# Patient Record
Sex: MALE | Race: WHITE | NOT HISPANIC OR LATINO | Employment: FULL TIME | URBAN - METROPOLITAN AREA
[De-identification: names, ages, dates, MRNs, and addresses within clinical notes are randomized per-mention and may not be internally consistent; named-entity substitution may affect disease eponyms.]

---

## 2017-02-06 RX ORDER — ATORVASTATIN CALCIUM 20 MG/1
TABLET, FILM COATED ORAL
Qty: 30 TABLET | Refills: 2 | Status: SHIPPED | OUTPATIENT
Start: 2017-02-06 | End: 2017-06-20

## 2017-03-06 RX ORDER — ATORVASTATIN CALCIUM 20 MG/1
TABLET, FILM COATED ORAL
Qty: 30 TABLET | Refills: 2 | Status: SHIPPED | OUTPATIENT
Start: 2017-03-06 | End: 2017-06-20

## 2017-04-18 ENCOUNTER — DOCUMENTATION (OUTPATIENT)
Dept: GASTROENTEROLOGY | Facility: CLINIC | Age: 57
End: 2017-04-18

## 2017-04-18 ENCOUNTER — TELEPHONE (OUTPATIENT)
Dept: GASTROENTEROLOGY | Facility: CLINIC | Age: 57
End: 2017-04-18

## 2017-04-18 NOTE — PROGRESS NOTES
Called [facility] ([phone] Physician Referral) spoke with Jean.  Requested GI consult-gave my patient insurance information and Medical History  Patient qualifies for consult: Patient to call [phone] an set appointment.  Patient to hand carry Medical records at the time of visit.   Medical records to include Imaging, Medical testing, notes and summary.  [place] Reference #[id number]

## 2017-04-18 NOTE — PROGRESS NOTES
Called [facility] ([phone] Physician Referral) spoke with Jean.  Requested GI consult-gave my patient insurance information and Medical History  Patient qualifies for consult: Patient to call [phone] an set appointment.  Patient to hand carry Medical records at the time of visit.   Medical records to include Imaging, Medical testing, notes and summary.  [place] Reference #[id number]

## 2017-04-18 NOTE — TELEPHONE ENCOUNTER
Spoke with patient informed Referral request to [facility]-has been accepted gave telephone # [phone] and [facility] Reference   # [id number]. Advised patient to call and schedule appointment to hand carry all Medical Records pertinent to include Imaging, Medical notes and summary. Also, advised patient to obtain any other medical records available from other physicians that may be relevant. Informed patient medical records from this office would be available to be picked up at anytime. Patient verbalized understanding.Encouraged patient to call back with any further questions.

## 2017-05-23 ENCOUNTER — OFFICE VISIT (OUTPATIENT)
Dept: SURGERY | Facility: CLINIC | Age: 57
End: 2017-05-23

## 2017-05-23 VITALS — HEART RATE: 68 BPM | OXYGEN SATURATION: 97 % | HEIGHT: 71 IN | WEIGHT: 230 LBS | BODY MASS INDEX: 32.2 KG/M2

## 2017-05-23 DIAGNOSIS — D3A.098 CARCINOID TUMOR OF INTESTINE: ICD-10-CM

## 2017-05-23 DIAGNOSIS — R10.84 GENERALIZED ABDOMINAL PAIN: Primary | ICD-10-CM

## 2017-05-23 PROCEDURE — 99211 OFF/OP EST MAY X REQ PHY/QHP: CPT | Performed by: SURGERY

## 2017-06-12 DIAGNOSIS — E78.5 HYPERLIPIDEMIA, UNSPECIFIED HYPERLIPIDEMIA TYPE: ICD-10-CM

## 2017-06-14 LAB
ALBUMIN SERPL-MCNC: 4.5 G/DL (ref 3.5–5.5)
ALBUMIN/GLOB SERPL: 2.3 {RATIO} (ref 1.2–2.2)
ALP SERPL-CCNC: 51 IU/L (ref 39–117)
ALT SERPL-CCNC: 20 IU/L (ref 0–44)
AST SERPL-CCNC: 17 IU/L (ref 0–40)
BILIRUB SERPL-MCNC: 0.6 MG/DL (ref 0–1.2)
BUN SERPL-MCNC: 13 MG/DL (ref 6–24)
BUN/CREAT SERPL: 15 (ref 9–20)
CALCIUM SERPL-MCNC: 9.1 MG/DL (ref 8.7–10.2)
CHLORIDE SERPL-SCNC: 102 MMOL/L (ref 96–106)
CHOLEST SERPL-MCNC: 144 MG/DL (ref 100–199)
CO2 SERPL-SCNC: 23 MMOL/L (ref 18–29)
CREAT SERPL-MCNC: 0.87 MG/DL (ref 0.76–1.27)
GLOBULIN SER CALC-MCNC: 2 G/DL (ref 1.5–4.5)
GLUCOSE SERPL-MCNC: 94 MG/DL (ref 65–99)
HBA1C MFR BLD: 5.4 % (ref 4.8–5.6)
HDL SERPL-SCNC: 39 UMOL/L
HDLC SERPL-MCNC: 49 MG/DL
LDL SERPL QN: 20.2 NM
LDL SERPL-SCNC: 1062 NMOL/L
LDL SMALL SERPL-SCNC: 682 NMOL/L
LDLC SERPL CALC-MCNC: 71 MG/DL (ref 0–99)
POTASSIUM SERPL-SCNC: 4.4 MMOL/L (ref 3.5–5.2)
PROT SERPL-MCNC: 6.5 G/DL (ref 6–8.5)
SODIUM SERPL-SCNC: 141 MMOL/L (ref 134–144)
TRIGL SERPL-MCNC: 120 MG/DL (ref 0–149)

## 2017-06-20 ENCOUNTER — OFFICE VISIT (OUTPATIENT)
Dept: FAMILY MEDICINE CLINIC | Facility: CLINIC | Age: 57
End: 2017-06-20

## 2017-06-20 VITALS
BODY MASS INDEX: 32.06 KG/M2 | HEIGHT: 71 IN | HEART RATE: 64 BPM | RESPIRATION RATE: 20 BRPM | DIASTOLIC BLOOD PRESSURE: 64 MMHG | SYSTOLIC BLOOD PRESSURE: 118 MMHG | WEIGHT: 229 LBS

## 2017-06-20 DIAGNOSIS — E78.5 HYPERLIPIDEMIA, UNSPECIFIED HYPERLIPIDEMIA TYPE: Primary | ICD-10-CM

## 2017-06-20 DIAGNOSIS — I25.10 CORONARY ARTERIOSCLEROSIS: ICD-10-CM

## 2017-06-20 PROCEDURE — 99214 OFFICE O/P EST MOD 30 MIN: CPT | Performed by: INTERNAL MEDICINE

## 2017-06-20 RX ORDER — ATORVASTATIN CALCIUM 40 MG/1
40 TABLET, FILM COATED ORAL DAILY
COMMUNITY
End: 2017-09-26

## 2017-06-20 RX ORDER — ISOSORBIDE MONONITRATE 120 MG/1
120 TABLET, EXTENDED RELEASE ORAL DAILY
COMMUNITY
Start: 2017-06-05 | End: 2020-07-06

## 2017-06-20 RX ORDER — EZETIMIBE 10 MG/1
10 TABLET ORAL DAILY
Qty: 30 TABLET | Refills: 5 | Status: SHIPPED | OUTPATIENT
Start: 2017-06-20 | End: 2017-09-26 | Stop reason: SINTOL

## 2017-06-20 NOTE — PROGRESS NOTES
Subjective   Kevin M Epley is a 56 y.o. male. Patient is here today for   Chief Complaint   Patient presents with   • Hyperlipidemia          Vitals:    06/20/17 0828   BP: 118/64   Pulse: 64   Resp: 20       The following portions of the patient's history were reviewed and updated as appropriate: allergies, current medications, past family history, past medical history, past social history, past surgical history and problem list.    Past Medical History:   Diagnosis Date   • Arthritis    • Bronchitis    • CAD (coronary artery disease)    • GERD (gastroesophageal reflux disease)    • History of heart attack    • Meckel's diverticulum    • Obesity    • Osteomyelitis of spine    • Sinusitis       Allergies   Allergen Reactions   • Amlodipine Other (See Comments)     bradycardia   • Mushroom Extract Complex Nausea And Vomiting      Social History     Social History   • Marital status:      Spouse name: N/A   • Number of children: N/A   • Years of education: N/A     Occupational History   • Not on file.     Social History Main Topics   • Smoking status: Former Smoker     Packs/day: 0.50     Years: 24.00     Types: Cigarettes     Quit date: 03/2004   • Smokeless tobacco: Never Used   • Alcohol use Yes      Comment: SOCIAL   • Drug use: No   • Sexual activity: Defer     Other Topics Concern   • Not on file     Social History Narrative        Current Outpatient Prescriptions:   •  aspirin 81 MG tablet, Take by mouth., Disp: , Rfl:   •  atorvastatin (LIPITOR) 40 MG tablet, Take 40 mg by mouth Daily., Disp: , Rfl:   •  clopidogrel (PLAVIX) 75 MG tablet, Take 75 mg by mouth., Disp: , Rfl:   •  folic acid (FOLVITE) 400 MCG tablet, Take 400 mcg by mouth Daily., Disp: , Rfl:   •  isosorbide mononitrate (IMDUR) 120 MG 24 hr tablet, , Disp: , Rfl:   •  Magnesium Hydroxide (MILK OF MAGNESIA PO), Take by mouth., Disp: , Rfl:   •  Multiple Vitamins tablet, Take 1 tablet by mouth Daily., Disp: , Rfl:   •  niacin (NIASPAN) 1000  MG CR tablet, Take 1,000 mg by mouth Every Night., Disp: , Rfl:   •  nitroglycerin (NITROSTAT) 0.4 MG SL tablet, Place 0.4 mg under the tongue Every 5 (Five) Minutes As Needed. Take no more than 3 doses in 15 minutes. , Disp: , Rfl:   •  VITAMIN E PO, Take 1 tablet by mouth Daily., Disp: , Rfl:   •  ezetimibe (ZETIA) 10 MG tablet, Take 1 tablet by mouth Daily., Disp: 30 tablet, Rfl: 5     Objective   History of Present Illness Joseph is here for a lab follow-up.  He has hyperlipidemia and coronary artery disease.  He had a cardiac catheterization last December which revealed 2 occluded stents.  He eats healthy but has not exercised.  He is going to start cardiac rehabilitation soon.  He has tried 80 mg of atorvastatin but had some side effects in the past.  He currently is on 40 mg and is tolerating it.  His weight is unchanged.  He also has chronic abdominal pain and recently went to [facility] who diagnosed him with functional dyspepsia and recommended an over-the-counter product that he takes twice a day.    Review of Systems   Constitutional: Positive for activity change. Negative for unexpected weight change.   Respiratory: Negative for shortness of breath.    Cardiovascular: Negative for chest pain.   Gastrointestinal: Positive for abdominal pain.   Neurological: Negative for light-headedness.   Psychiatric/Behavioral: Negative.        Physical Exam   Constitutional: He appears well-developed and well-nourished.   Obese   Neck: Carotid bruit is not present.   Cardiovascular: Normal rate, regular rhythm and normal heart sounds.    Pulmonary/Chest: Effort normal and breath sounds normal.   Vitals reviewed.      ASSESSMENT     Problem List Items Addressed This Visit        Cardiovascular and Mediastinum    Hyperlipidemia - Primary    Relevant Medications    atorvastatin (LIPITOR) 40 MG tablet    ezetimibe (ZETIA) 10 MG tablet    Coronary arteriosclerosis    Relevant Medications    isosorbide mononitrate (IMDUR)  120 MG 24 hr tablet          PLAN  Patient Instructions   Your blood pressure is normal.  Total cholesterol is 144 with an HDL of 49 and LDL concentration of 1062.  Goal is less than 1000.  Fasting blood sugar and hemoglobin A1c are normal.  We will add Zetia 10 mg daily and continue atorvastatin 40 mg daily.      Return in about 3 months (around 9/20/2017) for labsCMP,NMR LP.

## 2017-06-20 NOTE — PATIENT INSTRUCTIONS
Your blood pressure is normal.  Total cholesterol is 144 with an HDL of 49 and LDL concentration of 1062.  Goal is less than 1000.  Fasting blood sugar and hemoglobin A1c are normal.  We will add Zetia 10 mg daily and continue atorvastatin 40 mg daily.

## 2017-06-21 LAB
PSA SERPL-MCNC: 1.2 NG/ML (ref 0–4)
WRITTEN AUTHORIZATION: NORMAL

## 2017-07-03 NOTE — PROGRESS NOTES
CHIEF COMPLAINT:    Follow-up for carcinoid tumor    HISTORY OF PRESENT ILLNESS:    Kevin M Epley is a 56 y.o. male who underwent resection of a Meckel's diverticulum on 12/10/2015.  Prior to seeing me, he was followed by Dr. Ryan Lee and Dr. Goldy Adams for chronic abdominal pain and bloating.  It was suggested to him that he probably has an internal hernia near the duodenum and was planned for surgical exploration prior to Dr. Lee' [facility].  I did a laparoscopy in his place on 12/10/2015.  We identified adhesions at the site of prior laparoscopic cholecystectomy.  Duodenum and small bowel were normal with respect to potential internal hernias, but there was a Meckel's diverticulum that I identified as the only abnormal finding. Despite its non inflamed appearance, I removed it. Accordig to the pathology report, there was an 8 mm,  grade 1 carcinoid tumor within the diverticulum.  Margins were negative.  Follow-up CT exams have been normal.  He still has abdominal pain and bloating.  There is no vomiting.  There is no blood in stool.  There is no constipation or diarrhea.  He has plans for evaluation at the [facility].    Past Medical History:   Diagnosis Date   • Arthritis    • Bronchitis    • CAD (coronary artery disease)    • GERD (gastroesophageal reflux disease)    • History of heart attack    • Meckel's diverticulum    • Obesity    • Osteomyelitis of spine    • Sinusitis        Past Surgical History:   Procedure Laterality Date   • CARDIAC CATHETERIZATION N/A 09/24/2015    LM nl, 20% pLAD, 20% mLAD, 90% pLCx, 90% ostial OM1, 30% mid RCA luminal irregularities (dominant RCA), EF 45%   • CATARACT EXTRACTION     • CHOLECYSTECTOMY     • COLONOSCOPY  2010   • COLONOSCOPY N/A 02/19/2016    IH, diverticula in the sigmoid and descending colon, 9 mm polyp in the cecum (PATH: TUBULAR ADENOMA), 5 mm polyp in the ascending colon; Dr. Pavan Rock   • CORONARY ANGIOPLASTY WITH STENT PLACEMENT N/A 02/27/2004     Cath Left Ventriculograpy, Coronary Angiography, Coronary Drug Eluting Stent, Coronary PTCA, Intravascular Ultrasound & Left Heart Catheterization, Dr. Boris Martin   • ENDOSCOPY N/A 09/30/2015    Mild Duodenal dilation, Dr. Velaqsuez Claros   • ENDOSCOPY N/A 11/8/2016    Procedure: ESOPHAGOGASTRODUODENOSCOPY to Jejunum with biopsy;  Surgeon: Goldy Adams MD;  Location: [facility] ENDOSCOPY;  Service:    • INGUINAL HERNIA REPAIR     • MECKEL DIVERTICULUM EXCISION N/A 12/10/2015    Laparoscopic adhesiolysis, laparoscopic Meckel's diverticulum; Dr. Velasquez Claros   • SHOULDER SURGERY     • THORACIC FUSION      T4-T6 fusion   • UPPER GASTROINTESTINAL ENDOSCOPY N/A 10/16/2014    Normal esophagus and stomach; Dr. Goldy Adams   • UPPER GASTROINTESTINAL ENDOSCOPY N/A 07/11/2011    LA Grade A reflux esophagitis, normal stomach, normal duodenum, dilation attempted in the gastroesophageal junction, successful; Dr. Goldy Adams         Current Outpatient Prescriptions:   •  aspirin 81 MG tablet, Take by mouth., Disp: , Rfl:   •  clopidogrel (PLAVIX) 75 MG tablet, Take 75 mg by mouth., Disp: , Rfl:   •  folic acid (FOLVITE) 400 MCG tablet, Take 400 mcg by mouth Daily., Disp: , Rfl:   •  Magnesium Hydroxide (MILK OF MAGNESIA PO), Take by mouth., Disp: , Rfl:   •  Multiple Vitamins tablet, Take 1 tablet by mouth Daily., Disp: , Rfl:   •  niacin (NIASPAN) 1000 MG CR tablet, Take 1,000 mg by mouth Every Night., Disp: , Rfl:   •  VITAMIN E PO, Take 1 tablet by mouth Daily., Disp: , Rfl:   •  atorvastatin (LIPITOR) 40 MG tablet, Take 40 mg by mouth Daily., Disp: , Rfl:   •  ezetimibe (ZETIA) 10 MG tablet, Take 1 tablet by mouth Daily., Disp: 30 tablet, Rfl: 5  •  isosorbide mononitrate (IMDUR) 120 MG 24 hr tablet, , Disp: , Rfl:   •  nitroglycerin (NITROSTAT) 0.4 MG SL tablet, Place 0.4 mg under the tongue Every 5 (Five) Minutes As Needed. Take no more than 3 doses in 15 minutes. , Disp: , Rfl:     Allergies   Allergen  "Reactions   • Amlodipine Other (See Comments)     bradycardia   • Mushroom Extract Complex Nausea And Vomiting       Family History   Problem Relation Age of Onset   • Diabetes Father    • Heart disease Other    • Stomach cancer Maternal Aunt    • Breast cancer Paternal Grandmother        Social History     Social History   • Marital status:      Spouse name: N/A   • Number of children: N/A   • Years of education: N/A     Occupational History   • Not on file.     Social History Main Topics   • Smoking status: Former Smoker     Packs/day: 0.50     Years: 24.00     Types: Cigarettes     Quit date: 03/2004   • Smokeless tobacco: Never Used   • Alcohol use Yes      Comment: SOCIAL   • Drug use: No   • Sexual activity: Defer     Other Topics Concern   • Not on file     Social History Narrative       Review of Systems   Constitutional: Positive for diaphoresis.   Eyes: Positive for itching.   Respiratory: Positive for apnea.    Cardiovascular: Positive for chest pain and palpitations.   Gastrointestinal: Positive for abdominal distention and abdominal pain.   Allergic/Immunologic: Positive for environmental allergies and food allergies.   Hematological: Bruises/bleeds easily.       Objective     Pulse 68  Ht 71\" (180.3 cm)  Wt 230 lb (104 kg)  SpO2 97%  BMI 32.08 kg/m2    Physical Exam   Constitutional: He is oriented to person, place, and time. He appears well-developed and well-nourished. No distress.   HENT:   Head: Normocephalic and atraumatic.   Eyes: Conjunctivae are normal. No scleral icterus.   Cardiovascular: Normal rate, regular rhythm, normal heart sounds and intact distal pulses.    Pulmonary/Chest: Effort normal and breath sounds normal. No respiratory distress. He has no wheezes. He has no rales.   Abdominal: Soft. Bowel sounds are normal. He exhibits no distension. There is no tenderness. There is no guarding.   Musculoskeletal: He exhibits no edema or deformity.   Neurological: He is alert and " oriented to person, place, and time.   Skin: Skin is warm and dry.   Psychiatric: He has a normal mood and affect.   Vitals reviewed.      DIAGNOSTIC DATA:    I reviewed the images and report the last CT scan of the abdomen and pelvis performed on 12/22/2016.  It was unremarkable.    ASSESSMENT:    Small carcinoid tumor found incidentally within a Meckel's diverticulum  Abdominal pain and bloating.    PLAN:    Because he has an upcoming initial-encounter visit with the [facility], I'm not going to order any imaging tests.  I've asked him to give his physicians there my office number and fax so that they can forward notes and test results.

## 2017-09-18 DIAGNOSIS — E78.5 HYPERLIPIDEMIA, UNSPECIFIED HYPERLIPIDEMIA TYPE: Primary | ICD-10-CM

## 2017-09-21 LAB
ALBUMIN SERPL-MCNC: 4.3 G/DL (ref 3.5–5.2)
ALBUMIN/GLOB SERPL: 2.3 G/DL
ALP SERPL-CCNC: 51 U/L (ref 39–117)
ALT SERPL-CCNC: 19 U/L (ref 1–41)
AST SERPL-CCNC: 20 U/L (ref 1–40)
BILIRUB SERPL-MCNC: 0.5 MG/DL (ref 0.1–1.2)
BUN SERPL-MCNC: 14 MG/DL (ref 6–20)
BUN/CREAT SERPL: 13.9 (ref 7–25)
CALCIUM SERPL-MCNC: 9.1 MG/DL (ref 8.6–10.5)
CHLORIDE SERPL-SCNC: 104 MMOL/L (ref 98–107)
CHOLEST SERPL-MCNC: 111 MG/DL (ref 100–199)
CO2 SERPL-SCNC: 24.6 MMOL/L (ref 22–29)
CREAT SERPL-MCNC: 1.01 MG/DL (ref 0.76–1.27)
GLOBULIN SER CALC-MCNC: 1.9 GM/DL
GLUCOSE SERPL-MCNC: 98 MG/DL (ref 65–99)
HDL SERPL-SCNC: 33.7 UMOL/L
HDLC SERPL-MCNC: 44 MG/DL
LDL SERPL QN: 20 NM
LDL SERPL-SCNC: 817 NMOL/L
LDL SMALL SERPL-SCNC: 548 NMOL/L
LDLC SERPL CALC-MCNC: 51 MG/DL (ref 0–99)
POTASSIUM SERPL-SCNC: 4.6 MMOL/L (ref 3.5–5.2)
PROT SERPL-MCNC: 6.2 G/DL (ref 6–8.5)
SODIUM SERPL-SCNC: 141 MMOL/L (ref 136–145)
TRIGL SERPL-MCNC: 80 MG/DL (ref 0–149)

## 2017-09-26 ENCOUNTER — OFFICE VISIT (OUTPATIENT)
Dept: FAMILY MEDICINE CLINIC | Facility: CLINIC | Age: 57
End: 2017-09-26

## 2017-09-26 VITALS
WEIGHT: 234 LBS | SYSTOLIC BLOOD PRESSURE: 116 MMHG | BODY MASS INDEX: 32.76 KG/M2 | DIASTOLIC BLOOD PRESSURE: 64 MMHG | RESPIRATION RATE: 20 BRPM | HEART RATE: 61 BPM | HEIGHT: 71 IN

## 2017-09-26 DIAGNOSIS — I25.10 CORONARY ARTERIOSCLEROSIS: ICD-10-CM

## 2017-09-26 DIAGNOSIS — E78.5 HYPERLIPIDEMIA, UNSPECIFIED HYPERLIPIDEMIA TYPE: Primary | ICD-10-CM

## 2017-09-26 PROCEDURE — 99213 OFFICE O/P EST LOW 20 MIN: CPT | Performed by: INTERNAL MEDICINE

## 2017-09-26 RX ORDER — ATORVASTATIN CALCIUM 80 MG/1
80 TABLET, FILM COATED ORAL DAILY
COMMUNITY
Start: 2017-09-07 | End: 2019-06-10 | Stop reason: SDUPTHER

## 2017-09-26 NOTE — PATIENT INSTRUCTIONS
Total cholesterol is now 111.  HDL is 44 and LDL concentration is at goal at 817.  A comprehensive metabolic panel is normal.  Reviewed and discussed recent catheterization and angioplasty as well as treatment of angina.

## 2017-09-26 NOTE — PROGRESS NOTES
Subjective   Kevin M Epley is a 56 y.o. male. Patient is here today for   Chief Complaint   Patient presents with   • Hyperlipidemia          Vitals:    09/26/17 0911   BP: 116/64   Pulse: 61   Resp: 20       The following portions of the patient's history were reviewed and updated as appropriate: allergies, current medications, past family history, past medical history, past social history, past surgical history and problem list.    Past Medical History:   Diagnosis Date   • Arthritis    • Bronchitis    • CAD (coronary artery disease)    • GERD (gastroesophageal reflux disease)    • History of heart attack    • Meckel's diverticulum    • Obesity    • Osteomyelitis of spine    • Sinusitis       Allergies   Allergen Reactions   • Amlodipine Other (See Comments)     bradycardia   • Mushroom Extract Complex Nausea And Vomiting      Social History     Social History   • Marital status:      Spouse name: N/A   • Number of children: N/A   • Years of education: N/A     Occupational History   • Not on file.     Social History Main Topics   • Smoking status: Former Smoker     Packs/day: 0.50     Years: 24.00     Types: Cigarettes     Quit date: 03/2004   • Smokeless tobacco: Never Used   • Alcohol use Yes      Comment: SOCIAL   • Drug use: No   • Sexual activity: Defer     Other Topics Concern   • Not on file     Social History Narrative        Current Outpatient Prescriptions:   •  aspirin 81 MG tablet, Take by mouth., Disp: , Rfl:   •  atorvastatin (LIPITOR) 80 MG tablet, , Disp: , Rfl:   •  clopidogrel (PLAVIX) 75 MG tablet, Take 75 mg by mouth., Disp: , Rfl:   •  folic acid (FOLVITE) 400 MCG tablet, Take 400 mcg by mouth Daily., Disp: , Rfl:   •  isosorbide mononitrate (IMDUR) 120 MG 24 hr tablet, , Disp: , Rfl:   •  Magnesium Hydroxide (MILK OF MAGNESIA PO), Take by mouth., Disp: , Rfl:   •  Multiple Vitamins tablet, Take 1 tablet by mouth Daily., Disp: , Rfl:   •  niacin (NIASPAN) 1000 MG CR tablet, Take 1,000  mg by mouth Every Night., Disp: , Rfl:   •  nitroglycerin (NITROSTAT) 0.4 MG SL tablet, Place 0.4 mg under the tongue Every 5 (Five) Minutes As Needed. Take no more than 3 doses in 15 minutes. , Disp: , Rfl:   •  VITAMIN E PO, Take 1 tablet by mouth Daily., Disp: , Rfl:      Objective   History of Present Illness Joseph is here today for lab follow-up.  He has coronary artery disease and hyperlipidemia.  He was seen at [facility] earlier this year and underwent angioplasty ×2.  He was placed on is that he earlier this year as his LDL was not at goal but discontinued it due to leg cramps.  He did increase atorvastatin to 80 mg and is tolerating it well.  He is still experiencing angina when he walks much or cuts his grass.  The angina is relieved by rest.    Review of Systems   Constitutional: Positive for activity change.   Respiratory: Negative for shortness of breath.    Cardiovascular: Positive for chest pain.       Physical Exam   Constitutional: He appears well-developed and well-nourished.   Neck: Neck supple. Carotid bruit is not present.   Cardiovascular: Normal rate, regular rhythm and normal heart sounds.    Pulmonary/Chest: Effort normal and breath sounds normal.   Neurological: He is alert.   Psychiatric: He has a normal mood and affect.   Vitals reviewed.      ASSESSMENT     Problem List Items Addressed This Visit        Cardiovascular and Mediastinum    Hyperlipidemia - Primary    Relevant Medications    atorvastatin (LIPITOR) 80 MG tablet    Coronary arteriosclerosis          PLAN  Patient Instructions   Total cholesterol is now 111.  HDL is 44 and LDL concentration is at goal at 817.  A comprehensive metabolic panel is normal.  Reviewed and discussed recent catheterization and angioplasty as well as treatment of angina.      Return in about 6 months (around 3/26/2018) for labs CBC,CMP,LIPID,UA,PSA.

## 2017-10-11 ENCOUNTER — TELEPHONE (OUTPATIENT)
Dept: SURGERY | Facility: CLINIC | Age: 57
End: 2017-10-11

## 2017-10-11 ENCOUNTER — OFFICE VISIT (OUTPATIENT)
Dept: FAMILY MEDICINE CLINIC | Facility: CLINIC | Age: 57
End: 2017-10-11

## 2017-10-11 VITALS
RESPIRATION RATE: 16 BRPM | SYSTOLIC BLOOD PRESSURE: 108 MMHG | WEIGHT: 231.4 LBS | DIASTOLIC BLOOD PRESSURE: 64 MMHG | TEMPERATURE: 98.5 F | BODY MASS INDEX: 32.4 KG/M2 | OXYGEN SATURATION: 98 % | HEIGHT: 71 IN | HEART RATE: 75 BPM

## 2017-10-11 DIAGNOSIS — R50.9 FEVER, UNSPECIFIED FEVER CAUSE: Primary | ICD-10-CM

## 2017-10-11 DIAGNOSIS — R10.9 CHRONIC ABDOMINAL PAIN: ICD-10-CM

## 2017-10-11 DIAGNOSIS — G89.29 CHRONIC ABDOMINAL PAIN: ICD-10-CM

## 2017-10-11 LAB
BILIRUB BLD-MCNC: NEGATIVE MG/DL
CLARITY, POC: CLEAR
COLOR UR: NORMAL
EXPIRATION DATE: NORMAL
FLUAV AG NPH QL: NEGATIVE
FLUBV AG NPH QL: NEGATIVE
GLUCOSE UR STRIP-MCNC: NEGATIVE MG/DL
INTERNAL CONTROL: NORMAL
KETONES UR QL: NEGATIVE
LEUKOCYTE EST, POC: NEGATIVE
Lab: NORMAL
NITRITE UR-MCNC: NEGATIVE MG/ML
PH UR: 6.5 [PH] (ref 5–8)
PROT UR STRIP-MCNC: NORMAL MG/DL
RBC # UR STRIP: NEGATIVE /UL
SP GR UR: 1.02 (ref 1–1.03)
UROBILINOGEN UR QL: NORMAL

## 2017-10-11 PROCEDURE — 99214 OFFICE O/P EST MOD 30 MIN: CPT | Performed by: NURSE PRACTITIONER

## 2017-10-11 PROCEDURE — 81003 URINALYSIS AUTO W/O SCOPE: CPT | Performed by: NURSE PRACTITIONER

## 2017-10-11 PROCEDURE — 87804 INFLUENZA ASSAY W/OPTIC: CPT | Performed by: NURSE PRACTITIONER

## 2017-10-11 PROCEDURE — 85025 COMPLETE CBC W/AUTO DIFF WBC: CPT | Performed by: NURSE PRACTITIONER

## 2017-10-11 NOTE — TELEPHONE ENCOUNTER
Patient received a recall in the mail today for a CT a&p and a return visit with you. He recently had a CT at [facility] in September. (Viewable in [facility]).     Do you still need another CT or should I just make him an office appt to see you?     ADDEND: left message with [facility] to get disc. Also, left message with patient to confirm appt.

## 2017-10-11 NOTE — PROGRESS NOTES
Subjective   Kevin M Epley is a 56 y.o. male.   Chief Complaint   Patient presents with   • Fever     PT STATES STARTED SUNDAY    • Abdominal Pain     PT STATES HAS BEEN GOING ON A LONG TIME      Vitals:    10/11/17 1049   BP: 108/64   Pulse: 75   Resp: 16   Temp: 98.5 °F (36.9 °C)   SpO2: 98%     No LMP for male patient.    History of Present Illness  Joseph is a patient of Dr Bonner who is here for an acute visit. He c/o fever with tmax 101 for three days. He has a history of chronic abdominal pain for which he has been evaluated for at the [facility]  And had a CT scan that was negative for any acute findings in September.  Had some mild nausea after eating , chills and sweats. He  denies any urinary symptoms such as urgency frequency or dysuria.  He has had a mild dry cough that started yesterday and sneezing.  He is not yet on the flu vaccine.  He denies any ill exposure visited his mom last week in the hospital.  He has not taken any over-the-counter medications    The following portions of the patient's history were reviewed and updated as appropriate: allergies, current medications, past family history, past medical history, past social history, past surgical history and problem list.    Review of Systems   Constitutional: Positive for chills and diaphoresis. Negative for appetite change, fatigue, fever and unexpected weight change.   HENT: Negative for congestion, postnasal drip, rhinorrhea, sinus pain and sinus pressure.    Respiratory: Positive for cough (dry).    Cardiovascular: Negative for chest pain, palpitations and leg swelling.   Gastrointestinal: Positive for abdominal pain (Chronic as described in the history of present illness) and nausea. Negative for diarrhea.   Genitourinary: Positive for urgency. Negative for dysuria, frequency and hematuria.   Musculoskeletal: Negative for arthralgias.       Objective   Physical Exam   Constitutional: Vital signs are normal. He appears well-developed and  well-nourished.  Non-toxic appearance. He appears ill. No distress.   HENT:   Right Ear: Tympanic membrane and ear canal normal.   Left Ear: Tympanic membrane and ear canal normal.   Nose: Nose normal.   Mouth/Throat: Uvula is midline.   Cardiovascular: Normal rate and regular rhythm.    Pulmonary/Chest: Effort normal and breath sounds normal.   Abdominal: Soft. Normal appearance and bowel sounds are normal. There is tenderness in the left upper quadrant.   Neurological: He is alert.   Skin: Skin is warm and dry. No rash noted. There is pallor.     Brief Urine Lab Results  (Last result in the past 365 days)      Color   Clarity   Blood   Leuk Est   Nitrite   Protein   CREAT   Urine HCG        10/11/17 1116 Dark Yellow Clear Negative Negative Negative Trace(A)               Assessment/Plan   Joseph was seen today for fever and abdominal pain.    Diagnoses and all orders for this visit:    Fever, unspecified fever cause  -     POC CBC With / Auto Diff  -     POC Urinalysis Dipstick, Automated  -     POC Influenza A / B    Chronic abdominal pain    CBC was unremarkable and white blood cell count was normal   Urinalysis was negative  Flu swab was negative  Likely a viral illness. advised him to push fluids, rest,  Tylenol as needed   follow-up for persistent fever, if symptoms worsen, or if new symptoms develop  Advised to go to the Er for high fever, worsening abdominal pain, lethargy, or weakness, shortness of breath or CP

## 2017-10-12 NOTE — TELEPHONE ENCOUNTER
I reviewed the CT abdomen report performed at [facility]. I would like them to send a copy of the films on a CD so that I can look at them, and if so, we do not need to repeat it. He will need a non-urgent follow-up appointment to see me.

## 2017-10-24 ENCOUNTER — OFFICE VISIT (OUTPATIENT)
Dept: SURGERY | Facility: CLINIC | Age: 57
End: 2017-10-24

## 2017-10-24 VITALS — OXYGEN SATURATION: 97 % | HEART RATE: 63 BPM | HEIGHT: 71 IN | WEIGHT: 235.8 LBS | BODY MASS INDEX: 33.01 KG/M2

## 2017-10-24 DIAGNOSIS — D3A.098 CARCINOID TUMOR OF INTESTINE: Primary | ICD-10-CM

## 2017-10-24 PROCEDURE — 99212 OFFICE O/P EST SF 10 MIN: CPT | Performed by: SURGERY

## 2017-10-24 NOTE — PROGRESS NOTES
"CHIEF COMPLAINT:    Follow-up for carcinoid tumor    HISTORY OF PRESENT ILLNESS:    Kevin M Epley is a 56 y.o. male who underwent resection of a Meckel's diverticulum on 12/10/2015.  Prior to seeing me, he was followed by Dr. Ryan Lee and Dr. Goldy Adams for chronic abdominal pain and bloating.  It was suggested to him that he probably had an internal hernia near the duodenum, and was planned for surgical exploration prior to Dr. Lee' long-term.  I did a laparoscopy on 12/10/2015. I identified adhesions at the site of prior laparoscopic cholecystectomy.  The duodenum and small bowel were normal with respect to potential internal hernias, but there was a Meckel's diverticulum that I identified as the only abnormal finding. Despite its non inflamed appearance, I removed it.  There was an 8 mm,  grade 1 carcinoid tumor within the diverticulum.  Margins were negative.  Follow-up CT exams have been normal.    He still has abdominal pain and bloating.  He has been evaluated at the [facility]. He does not report any new diagnosis that arose from that evaluation.    Recently, he was seen at the [facility] ED for abdominal pain and vomiting.  CT imaging was performed of the abdomen and pelvis.  The report says it was largely unremarkable, although I have not seen the images as yet.  His complaints were ultimately attributed to a gastrointestinal virus.  During that series of vomiting, he says he felt a knot come up in the left upper quadrant of the abdominal wall.  It looked like a \"knuckle sticking out.\"  There is no mention in the CT scan report of the presence of abdominal wall hernias.    He continues to take milk of magnesia daily for mild constipation symptoms.  He has no change in his baseline digestive complaints.  There is bloating and occasional nausea.  There is cramping pain.    His last colonoscopy was performed by Dr. Pavan Brown on 2/19/2016. There was a tubular adenoma in the " ascending colon.      Past Medical History:   Diagnosis Date   • Arthritis    • Bronchitis    • CAD (coronary artery disease)    • GERD (gastroesophageal reflux disease)    • History of heart attack    • Meckel's diverticulum    • Obesity    • Osteomyelitis of spine    • Sinusitis        Past Surgical History:   Procedure Laterality Date   • CARDIAC CATHETERIZATION N/A 09/24/2015    LM nl, 20% pLAD, 20% mLAD, 90% pLCx, 90% ostial OM1, 30% mid RCA luminal irregularities (dominant RCA), EF 45%   • CATARACT EXTRACTION     • CHOLECYSTECTOMY     • COLONOSCOPY  2010   • COLONOSCOPY N/A 02/19/2016    IH, diverticula in the sigmoid and descending colon, 9 mm polyp in the cecum (PATH: TUBULAR ADENOMA), 5 mm polyp in the ascending colon; Dr. Pavan Rock   • CORONARY ANGIOPLASTY WITH STENT PLACEMENT N/A 02/27/2004    Cath Left Ventriculograpy, Coronary Angiography, Coronary Drug Eluting Stent, Coronary PTCA, Intravascular Ultrasound & Left Heart Catheterization, Dr. Boris Martin   • ENDOSCOPY N/A 09/30/2015    Mild Duodenal dilation, Dr. Velasquez Claros   • ENDOSCOPY N/A 11/8/2016    Procedure: ESOPHAGOGASTRODUODENOSCOPY to Jejunum with biopsy;  Surgeon: Goldy Adams MD;  Location: [facility];  Service:    • INGUINAL HERNIA REPAIR     • MECKEL DIVERTICULUM EXCISION N/A 12/10/2015    Laparoscopic adhesiolysis, laparoscopic Meckel's diverticulum; Dr. Velasquez Claros   • SHOULDER SURGERY     • THORACIC FUSION      T4-T6 fusion   • UPPER GASTROINTESTINAL ENDOSCOPY N/A 10/16/2014    Normal esophagus and stomach; Dr. Goldy Adams   • UPPER GASTROINTESTINAL ENDOSCOPY N/A 07/11/2011    LA Grade A reflux esophagitis, normal stomach, normal duodenum, dilation attempted in the gastroesophageal junction, successful; Dr. Goldy Adams         Current Outpatient Prescriptions:   •  aspirin 81 MG tablet, Take 81 mg by mouth Daily., Disp: , Rfl:   •  atorvastatin (LIPITOR) 80 MG tablet, Take 80 mg by mouth Daily., Disp: , Rfl:   •  " clopidogrel (PLAVIX) 75 MG tablet, Take 75 mg by mouth., Disp: , Rfl:   •  isosorbide mononitrate (IMDUR) 120 MG 24 hr tablet, Take 120 mg by mouth Daily., Disp: , Rfl:   •  Magnesium Hydroxide (MILK OF MAGNESIA PO), Take  by mouth Daily., Disp: , Rfl:   •  Multiple Vitamins tablet, Take 1 tablet by mouth Daily., Disp: , Rfl:   •  niacin (NIASPAN) 1000 MG CR tablet, Take 1,000 mg by mouth Every Night., Disp: , Rfl:   •  VITAMIN E PO, Take 1 tablet by mouth Daily., Disp: , Rfl:   •  nitroglycerin (NITROSTAT) 0.4 MG SL tablet, Place 0.4 mg under the tongue Every 5 (Five) Minutes As Needed. Take no more than 3 doses in 15 minutes. , Disp: , Rfl:     Allergies   Allergen Reactions   • Amlodipine Other (See Comments)     bradycardia   • Mushroom Extract Complex Nausea And Vomiting       Family History   Problem Relation Age of Onset   • Diabetes Father    • Heart disease Other    • Stomach cancer Maternal Aunt    • Breast cancer Paternal Grandmother        Social History     Social History   • Marital status:      Spouse name: N/A   • Number of children: N/A   • Years of education: N/A     Occupational History   • Not on file.     Social History Main Topics   • Smoking status: Former Smoker     Packs/day: 0.50     Years: 24.00     Types: Cigarettes     Quit date: 03/2004   • Smokeless tobacco: Former User   • Alcohol use Yes      Comment: SOCIAL   • Drug use: No   • Sexual activity: Defer     Other Topics Concern   • Not on file     Social History Narrative       Review of Systems:     chest pain, abdominal distension, abdominal pain, food allergies    Objective     Pulse 63  Ht 71\" (180.3 cm)  Wt 235 lb 12.8 oz (107 kg)  SpO2 97%  BMI 32.89 kg/m2    Physical Exam   Constitutional: He is oriented to person, place, and time. He appears well-developed and well-nourished. No distress.   HENT:   Head: Normocephalic and atraumatic.   Eyes: Conjunctivae are normal. No scleral icterus.   Cardiovascular: Normal rate, " regular rhythm, normal heart sounds and intact distal pulses.    Pulmonary/Chest: Effort normal and breath sounds normal. No respiratory distress. He has no wheezes. He has no rales.   Abdominal: Soft. Bowel sounds are normal. He exhibits no distension. There is no tenderness. There is no guarding.   Musculoskeletal: He exhibits no edema or deformity.   Neurological: He is alert and oriented to person, place, and time.   Skin: Skin is warm and dry.   Psychiatric: He has a normal mood and affect.   Vitals reviewed.      DIAGNOSTIC DATA:    I reviewed the report the recent CT scan of the abdomen and pelvis performed on 9/10/2017.  It was unremarkable.    ASSESSMENT:    Small carcinoid tumor found incidentally within a Meckel's diverticulum  Abdominal pain and bloating.    PLAN:    I will get the images from the recent CT scan to look at.  He will come back to see me again next year with a CT for carcinoid follow-up

## 2018-03-16 DIAGNOSIS — Z12.5 SCREENING PSA (PROSTATE SPECIFIC ANTIGEN): ICD-10-CM

## 2018-03-16 DIAGNOSIS — Z11.59 NEED FOR HEPATITIS C SCREENING TEST: Primary | ICD-10-CM

## 2018-03-16 DIAGNOSIS — E78.5 HYPERLIPIDEMIA, UNSPECIFIED HYPERLIPIDEMIA TYPE: ICD-10-CM

## 2018-03-16 DIAGNOSIS — I10 ESSENTIAL HYPERTENSION: ICD-10-CM

## 2018-03-22 LAB
ALBUMIN SERPL-MCNC: 4.2 G/DL (ref 3.5–5.2)
ALBUMIN/GLOB SERPL: 2 G/DL
ALP SERPL-CCNC: 51 U/L (ref 39–117)
ALT SERPL-CCNC: 25 U/L (ref 1–41)
APPEARANCE UR: CLEAR
AST SERPL-CCNC: 16 U/L (ref 1–40)
BACTERIA #/AREA URNS HPF: NORMAL /HPF
BASOPHILS # BLD AUTO: 0.02 10*3/MM3 (ref 0–0.2)
BASOPHILS NFR BLD AUTO: 0.3 % (ref 0–1.5)
BILIRUB SERPL-MCNC: 0.6 MG/DL (ref 0.1–1.2)
BILIRUB UR QL STRIP: NEGATIVE
BUN SERPL-MCNC: 14 MG/DL (ref 6–20)
BUN/CREAT SERPL: 14.6 (ref 7–25)
CALCIUM SERPL-MCNC: 8.4 MG/DL (ref 8.6–10.5)
CASTS URNS MICRO: NORMAL
CHLORIDE SERPL-SCNC: 102 MMOL/L (ref 98–107)
CHOLEST SERPL-MCNC: 117 MG/DL (ref 0–200)
CO2 SERPL-SCNC: 25.2 MMOL/L (ref 22–29)
COLOR UR: YELLOW
CREAT SERPL-MCNC: 0.96 MG/DL (ref 0.76–1.27)
EOSINOPHIL # BLD AUTO: 0.18 10*3/MM3 (ref 0–0.7)
EOSINOPHIL NFR BLD AUTO: 2.6 % (ref 0.3–6.2)
EPI CELLS #/AREA URNS HPF: NORMAL /HPF
ERYTHROCYTE [DISTWIDTH] IN BLOOD BY AUTOMATED COUNT: 13.1 % (ref 11.5–14.5)
GFR SERPLBLD CREATININE-BSD FMLA CKD-EPI: 81 ML/MIN/1.73
GFR SERPLBLD CREATININE-BSD FMLA CKD-EPI: 98 ML/MIN/1.73
GLOBULIN SER CALC-MCNC: 2.1 GM/DL
GLUCOSE SERPL-MCNC: 112 MG/DL (ref 65–99)
GLUCOSE UR QL: NEGATIVE
HCT VFR BLD AUTO: 47.3 % (ref 40.4–52.2)
HCV AB S/CO SERPL IA: 0.1 S/CO RATIO (ref 0–0.9)
HCV AB SERPL QL IA: NORMAL
HDLC SERPL-MCNC: 43 MG/DL (ref 40–60)
HGB BLD-MCNC: 15.2 G/DL (ref 13.7–17.6)
HGB UR QL STRIP: NEGATIVE
IMM GRANULOCYTES # BLD: 0 10*3/MM3 (ref 0–0.03)
IMM GRANULOCYTES NFR BLD: 0 % (ref 0–0.5)
KETONES UR QL STRIP: NEGATIVE
LDLC SERPL CALC-MCNC: 58 MG/DL (ref 0–100)
LDLC/HDLC SERPL: 1.36 {RATIO}
LEUKOCYTE ESTERASE UR QL STRIP: NEGATIVE
LYMPHOCYTES # BLD AUTO: 1.75 10*3/MM3 (ref 0.9–4.8)
LYMPHOCYTES NFR BLD AUTO: 25.3 % (ref 19.6–45.3)
MCH RBC QN AUTO: 28.2 PG (ref 27–32.7)
MCHC RBC AUTO-ENTMCNC: 32.1 G/DL (ref 32.6–36.4)
MCV RBC AUTO: 87.8 FL (ref 79.8–96.2)
MONOCYTES # BLD AUTO: 0.72 10*3/MM3 (ref 0.2–1.2)
MONOCYTES NFR BLD AUTO: 10.4 % (ref 5–12)
NEUTROPHILS # BLD AUTO: 4.26 10*3/MM3 (ref 1.9–8.1)
NEUTROPHILS NFR BLD AUTO: 61.4 % (ref 42.7–76)
NITRITE UR QL STRIP: NEGATIVE
PH UR STRIP: 6.5 [PH] (ref 5–8)
PLATELET # BLD AUTO: 163 10*3/MM3 (ref 140–500)
POTASSIUM SERPL-SCNC: 4.5 MMOL/L (ref 3.5–5.2)
PROT SERPL-MCNC: 6.3 G/DL (ref 6–8.5)
PROT UR QL STRIP: NEGATIVE
PSA SERPL-MCNC: 1.26 NG/ML (ref 0–4)
RBC # BLD AUTO: 5.39 10*6/MM3 (ref 4.6–6)
RBC #/AREA URNS HPF: NORMAL /HPF
SODIUM SERPL-SCNC: 141 MMOL/L (ref 136–145)
SP GR UR: 1.01 (ref 1–1.03)
TRIGL SERPL-MCNC: 78 MG/DL (ref 0–150)
UROBILINOGEN UR STRIP-MCNC: (no result) MG/DL
VLDLC SERPL CALC-MCNC: 15.6 MG/DL (ref 5–40)
WBC # BLD AUTO: 6.93 10*3/MM3 (ref 4.5–10.7)
WBC #/AREA URNS HPF: NORMAL /HPF

## 2018-03-27 ENCOUNTER — OFFICE VISIT (OUTPATIENT)
Dept: FAMILY MEDICINE CLINIC | Facility: CLINIC | Age: 58
End: 2018-03-27

## 2018-03-27 VITALS
BODY MASS INDEX: 34.86 KG/M2 | DIASTOLIC BLOOD PRESSURE: 70 MMHG | HEART RATE: 60 BPM | HEIGHT: 71 IN | SYSTOLIC BLOOD PRESSURE: 118 MMHG | WEIGHT: 249 LBS | RESPIRATION RATE: 18 BRPM

## 2018-03-27 DIAGNOSIS — Z23 NEED FOR VACCINATION: ICD-10-CM

## 2018-03-27 DIAGNOSIS — I25.10 CORONARY ARTERIOSCLEROSIS: ICD-10-CM

## 2018-03-27 DIAGNOSIS — E78.5 HYPERLIPIDEMIA, UNSPECIFIED HYPERLIPIDEMIA TYPE: Primary | ICD-10-CM

## 2018-03-27 DIAGNOSIS — R06.83 SNORING: ICD-10-CM

## 2018-03-27 PROCEDURE — 90715 TDAP VACCINE 7 YRS/> IM: CPT | Performed by: INTERNAL MEDICINE

## 2018-03-27 PROCEDURE — 90471 IMMUNIZATION ADMIN: CPT | Performed by: INTERNAL MEDICINE

## 2018-03-27 PROCEDURE — 99214 OFFICE O/P EST MOD 30 MIN: CPT | Performed by: INTERNAL MEDICINE

## 2018-03-27 NOTE — PATIENT INSTRUCTIONS
Blood pressure remains normal.  Fasting blood sugar is mildly elevated at 112.  Total cholesterol is 117.  HDL is 43 and LDL is at goal at 58.  Complete blood count, urinalysis, and PSA are normal.  Hepatitis C is negative.  Discussed diet and exercise and weight loss per AHA guidelines.  Will continue current medicines and follow-up in 6 months.

## 2018-03-27 NOTE — PROGRESS NOTES
Subjective   Kevin M Epley is a 57 y.o. male. Patient is here today for   Chief Complaint   Patient presents with   • Hyperlipidemia   • Hypertension   • PSA Screening          Vitals:    03/27/18 0903   BP: 118/70   Pulse: 60   Resp: 18       The following portions of the patient's history were reviewed and updated as appropriate: allergies, current medications, past family history, past medical history, past social history, past surgical history and problem list.    Past Medical History:   Diagnosis Date   • Arthritis    • Bronchitis    • CAD (coronary artery disease)    • GERD (gastroesophageal reflux disease)    • History of heart attack    • Meckel's diverticulum    • Obesity    • Osteomyelitis of spine    • Sinusitis       Allergies   Allergen Reactions   • Amlodipine Other (See Comments)     bradycardia   • Mushroom Extract Complex Nausea And Vomiting      Social History     Social History   • Marital status:      Spouse name: N/A   • Number of children: N/A   • Years of education: N/A     Occupational History   • Not on file.     Social History Main Topics   • Smoking status: Former Smoker     Packs/day: 0.50     Years: 24.00     Types: Cigarettes     Quit date: 03/2004   • Smokeless tobacco: Former User   • Alcohol use Yes      Comment: SOCIAL   • Drug use: No   • Sexual activity: Defer     Other Topics Concern   • Not on file     Social History Narrative   • No narrative on file        Current Outpatient Prescriptions:   •  aspirin 81 MG tablet, Take 81 mg by mouth Daily., Disp: , Rfl:   •  atorvastatin (LIPITOR) 80 MG tablet, Take 80 mg by mouth Daily., Disp: , Rfl:   •  clopidogrel (PLAVIX) 75 MG tablet, Take 75 mg by mouth., Disp: , Rfl:   •  isosorbide mononitrate (IMDUR) 120 MG 24 hr tablet, Take 120 mg by mouth Daily., Disp: , Rfl:   •  Magnesium Hydroxide (MILK OF MAGNESIA PO), Take  by mouth Daily., Disp: , Rfl:   •  Multiple Vitamins tablet, Take 1 tablet by mouth Daily., Disp: , Rfl:   •   niacin (NIASPAN) 1000 MG CR tablet, Take 1,000 mg by mouth Every Night., Disp: , Rfl:   •  nitroglycerin (NITROSTAT) 0.4 MG SL tablet, Place 0.4 mg under the tongue Every 5 (Five) Minutes As Needed. Take no more than 3 doses in 15 minutes. , Disp: , Rfl:   •  Omega-3 Fatty Acids (FISH OIL PO), Take  by mouth., Disp: , Rfl:   •  VITAMIN E PO, Take 1 tablet by mouth Daily., Disp: , Rfl:      Objective   History of Present Illness Joseph is here for lab follow-up.  He has coronary artery disease, obesity, and hyperlipidemia.  He was recently treated at [facility] for coronary artery stent restenosis.  He has not been eating as healthy as he had been and has not been exercise.  He has gained a lot of weight in the last 6 months.  His wife has been ill and recently underwent a mitral valve repair.    Review of Systems   Constitutional: Positive for activity change.        Weight increase 15 lbs   Respiratory: Negative.    Cardiovascular: Negative for chest pain.   Neurological: Negative for light-headedness.   Psychiatric/Behavioral:        Recent stress       Physical Exam   Constitutional: He appears well-developed and well-nourished.   Cardiovascular: Normal rate, regular rhythm and normal heart sounds.    Pulmonary/Chest: Effort normal and breath sounds normal.   Neurological: He is alert.   Psychiatric: He has a normal mood and affect.   Vitals reviewed.      ASSESSMENT     Problem List Items Addressed This Visit        Cardiovascular and Mediastinum    Hyperlipidemia - Primary    Coronary arteriosclerosis       Respiratory    Snoring      Other Visit Diagnoses     Need for vaccination        Relevant Orders    Tdap Vaccine Greater Than or Equal To 6yo IM (Completed)          PLAN  Patient Instructions   Blood pressure remains normal.  Fasting blood sugar is mildly elevated at 112.  Total cholesterol is 117.  HDL is 43 and LDL is at goal at 58.  Complete blood count, urinalysis, and PSA are normal.  Hepatitis C  is negative.  Discussed diet and exercise and weight loss per AHA guidelines.  Will continue current medicines and follow-up in 6 months.      Return in about 6 months (around 9/27/2018) for labsBMP,A1C,LIPID.

## 2018-09-14 DIAGNOSIS — E78.5 HYPERLIPIDEMIA, UNSPECIFIED HYPERLIPIDEMIA TYPE: Primary | ICD-10-CM

## 2018-09-14 DIAGNOSIS — R73.01 ELEVATED FASTING GLUCOSE: ICD-10-CM

## 2018-09-18 LAB
BUN SERPL-MCNC: 11 MG/DL (ref 6–20)
BUN/CREAT SERPL: 12.5 (ref 7–25)
CALCIUM SERPL-MCNC: 8.8 MG/DL (ref 8.6–10.5)
CHLORIDE SERPL-SCNC: 103 MMOL/L (ref 98–107)
CHOLEST SERPL-MCNC: 111 MG/DL (ref 0–200)
CO2 SERPL-SCNC: 25.1 MMOL/L (ref 22–29)
CREAT SERPL-MCNC: 0.88 MG/DL (ref 0.76–1.27)
GLUCOSE SERPL-MCNC: 113 MG/DL (ref 65–99)
HBA1C MFR BLD: 5.47 % (ref 4.8–5.6)
HDLC SERPL-MCNC: 40 MG/DL (ref 40–60)
LDLC SERPL CALC-MCNC: 52 MG/DL (ref 0–100)
LDLC/HDLC SERPL: 1.29 {RATIO}
POTASSIUM SERPL-SCNC: 4 MMOL/L (ref 3.5–5.2)
SODIUM SERPL-SCNC: 139 MMOL/L (ref 136–145)
TRIGL SERPL-MCNC: 97 MG/DL (ref 0–150)
VLDLC SERPL CALC-MCNC: 19.4 MG/DL (ref 5–40)

## 2018-09-25 ENCOUNTER — OFFICE VISIT (OUTPATIENT)
Dept: FAMILY MEDICINE CLINIC | Facility: CLINIC | Age: 58
End: 2018-09-25

## 2018-09-25 VITALS
BODY MASS INDEX: 34.58 KG/M2 | HEIGHT: 71 IN | SYSTOLIC BLOOD PRESSURE: 106 MMHG | DIASTOLIC BLOOD PRESSURE: 54 MMHG | RESPIRATION RATE: 16 BRPM | TEMPERATURE: 97.5 F | WEIGHT: 247 LBS | HEART RATE: 76 BPM

## 2018-09-25 DIAGNOSIS — Z23 NEED FOR VACCINATION: Primary | ICD-10-CM

## 2018-09-25 DIAGNOSIS — M72.2 PLANTAR FASCIITIS: ICD-10-CM

## 2018-09-25 DIAGNOSIS — E78.5 HYPERLIPIDEMIA, UNSPECIFIED HYPERLIPIDEMIA TYPE: ICD-10-CM

## 2018-09-25 DIAGNOSIS — I25.10 CORONARY ARTERIOSCLEROSIS: ICD-10-CM

## 2018-09-25 PROCEDURE — 90674 CCIIV4 VAC NO PRSV 0.5 ML IM: CPT | Performed by: INTERNAL MEDICINE

## 2018-09-25 PROCEDURE — 99214 OFFICE O/P EST MOD 30 MIN: CPT | Performed by: INTERNAL MEDICINE

## 2018-09-25 PROCEDURE — 90471 IMMUNIZATION ADMIN: CPT | Performed by: INTERNAL MEDICINE

## 2018-09-25 NOTE — PROGRESS NOTES
Subjective   Kevin M Epley is a 57 y.o. male. Patient is here today for   Chief Complaint   Patient presents with   • Hyperlipidemia          Vitals:    09/25/18 1440   BP: 106/54   Pulse: 76   Resp: 16   Temp: 97.5 °F (36.4 °C)       The following portions of the patient's history were reviewed and updated as appropriate: allergies, current medications, past family history, past medical history, past social history, past surgical history and problem list.    Past Medical History:   Diagnosis Date   • Arthritis    • Bronchitis    • CAD (coronary artery disease)    • GERD (gastroesophageal reflux disease)    • History of heart attack    • Meckel's diverticulum    • Obesity    • Osteomyelitis of spine (CMS/HCC)    • Sinusitis       Allergies   Allergen Reactions   • Amlodipine Other (See Comments)     bradycardia   • Mushroom Extract Complex Nausea And Vomiting      Social History     Social History   • Marital status:      Spouse name: N/A   • Number of children: N/A   • Years of education: N/A     Occupational History   • Not on file.     Social History Main Topics   • Smoking status: Former Smoker     Packs/day: 0.50     Years: 24.00     Types: Cigarettes     Quit date: 03/2004   • Smokeless tobacco: Former User   • Alcohol use Yes      Comment: SOCIAL   • Drug use: No   • Sexual activity: Defer     Other Topics Concern   • Not on file     Social History Narrative   • No narrative on file        Current Outpatient Prescriptions:   •  aspirin 81 MG tablet, Take 81 mg by mouth Daily., Disp: , Rfl:   •  atorvastatin (LIPITOR) 80 MG tablet, Take 80 mg by mouth Daily., Disp: , Rfl:   •  clopidogrel (PLAVIX) 75 MG tablet, Take 75 mg by mouth., Disp: , Rfl:   •  isosorbide mononitrate (IMDUR) 120 MG 24 hr tablet, Take 120 mg by mouth Daily., Disp: , Rfl:   •  Magnesium Hydroxide (MILK OF MAGNESIA PO), Take  by mouth Daily., Disp: , Rfl:   •  Multiple Vitamins tablet, Take 1 tablet by mouth Daily., Disp: , Rfl:   •   niacin (NIASPAN) 1000 MG CR tablet, Take 1,000 mg by mouth Every Night., Disp: , Rfl:   •  nitroglycerin (NITROSTAT) 0.4 MG SL tablet, Place 0.4 mg under the tongue Every 5 (Five) Minutes As Needed. Take no more than 3 doses in 15 minutes. , Disp: , Rfl:   •  Omega-3 Fatty Acids (FISH OIL PO), Take  by mouth., Disp: , Rfl:   •  VITAMIN E PO, Take 1 tablet by mouth Daily., Disp: , Rfl:   •  Zn-Pyg Afri-Nettle-Saw Palmet (SAW PALMETTO COMPLEX PO), Take  by mouth., Disp: , Rfl:      Objective   History of Present Illness Joseph is here for a blood pressure check and lab follow-up.  He has coronary artery disease, obesity, and hyperlipidemia.  He eats healthy but is not been able to exercise due to bilateral foot pain.  He thinks he has plantar fasciitis.  He denies chest pain or any symptoms of heart failure.  His weight is unchanged in the last 6 months.    Review of Systems   Constitutional: Negative for activity change and unexpected weight change.   Respiratory: Negative.    Cardiovascular: Negative for chest pain.   Genitourinary: Negative.    Neurological: Negative.    Psychiatric/Behavioral: Negative.        Physical Exam   Constitutional: He appears well-developed and well-nourished.   Neck: Carotid bruit is not present.   Cardiovascular: Normal rate, regular rhythm and normal heart sounds.    Pulmonary/Chest: Effort normal and breath sounds normal.   Musculoskeletal: He exhibits no edema.   Psychiatric: He has a normal mood and affect. His behavior is normal. Judgment and thought content normal.   Vitals reviewed.      ASSESSMENT     Problem List Items Addressed This Visit        Cardiovascular and Mediastinum    Hyperlipidemia    Coronary arteriosclerosis       Musculoskeletal and Integument    Plantar fasciitis      Other Visit Diagnoses     Need for vaccination    -  Primary    Relevant Orders    Flucelvax Quad=>4Years ([id number]) (Completed)          PLAN  Patient Instructions   Blood pressure is normal.   Total cholesterol is 111.  HDL is 40 and LDL is at goal at 52.  Fasting blood sugar is mildly elevated at 113 and hemoglobin A1c is normal at 5.47.  Discussed diet, exercise, and weight loss.  Discussed treatment of plantar fasciitis including wearing proper footwear at all times, heel cord stretching 4 times a day, whirlpool, and a trial of anti-inflammatory.  Also discussed appropriate immunizations.      Return in about 6 months (around 3/25/2019) for labs CBC,CMP,LIPID,TSH,PSA,UA.

## 2018-09-25 NOTE — PATIENT INSTRUCTIONS
Blood pressure is normal.  Total cholesterol is 111.  HDL is 40 and LDL is at goal at 52.  Fasting blood sugar is mildly elevated at 113 and hemoglobin A1c is normal at 5.47.  Discussed diet, exercise, and weight loss.  Discussed treatment of plantar fasciitis including wearing proper footwear at all times, heel cord stretching 4 times a day, whirlpool, and a trial of anti-inflammatory.  Also discussed appropriate immunizations.

## 2019-03-20 DIAGNOSIS — E78.5 HYPERLIPIDEMIA, UNSPECIFIED HYPERLIPIDEMIA TYPE: ICD-10-CM

## 2019-03-20 DIAGNOSIS — Z12.5 SCREENING PSA (PROSTATE SPECIFIC ANTIGEN): ICD-10-CM

## 2019-03-20 DIAGNOSIS — I10 ESSENTIAL HYPERTENSION: ICD-10-CM

## 2019-04-20 LAB
ALBUMIN SERPL-MCNC: 4.6 G/DL (ref 3.5–5.2)
ALBUMIN/GLOB SERPL: 2.3 G/DL
ALP SERPL-CCNC: 56 U/L (ref 39–117)
ALT SERPL-CCNC: 27 U/L (ref 1–41)
APPEARANCE UR: CLEAR
AST SERPL-CCNC: 16 U/L (ref 1–40)
BACTERIA #/AREA URNS HPF: NORMAL /HPF
BASOPHILS # BLD AUTO: 0.04 10*3/MM3 (ref 0–0.2)
BASOPHILS NFR BLD AUTO: 0.6 % (ref 0–1.5)
BILIRUB SERPL-MCNC: 0.5 MG/DL (ref 0.2–1.2)
BILIRUB UR QL STRIP: NEGATIVE
BUN SERPL-MCNC: 14 MG/DL (ref 6–20)
BUN/CREAT SERPL: 13 (ref 7–25)
CALCIUM SERPL-MCNC: 8.8 MG/DL (ref 8.6–10.5)
CASTS URNS MICRO: NORMAL
CHLORIDE SERPL-SCNC: 105 MMOL/L (ref 98–107)
CHOLEST SERPL-MCNC: 109 MG/DL (ref 0–200)
CO2 SERPL-SCNC: 26.1 MMOL/L (ref 22–29)
COLOR UR: YELLOW
CREAT SERPL-MCNC: 1.08 MG/DL (ref 0.76–1.27)
EOSINOPHIL # BLD AUTO: 0.18 10*3/MM3 (ref 0–0.4)
EOSINOPHIL NFR BLD AUTO: 2.8 % (ref 0.3–6.2)
EPI CELLS #/AREA URNS HPF: NORMAL /HPF
ERYTHROCYTE [DISTWIDTH] IN BLOOD BY AUTOMATED COUNT: 13.3 % (ref 12.3–15.4)
GLOBULIN SER CALC-MCNC: 2 GM/DL
GLUCOSE SERPL-MCNC: 114 MG/DL (ref 65–99)
GLUCOSE UR QL: NEGATIVE
HCT VFR BLD AUTO: 48.5 % (ref 37.5–51)
HDLC SERPL-MCNC: 41 MG/DL (ref 40–60)
HGB BLD-MCNC: 15.2 G/DL (ref 13–17.7)
HGB UR QL STRIP: NEGATIVE
IMM GRANULOCYTES # BLD AUTO: 0.01 10*3/MM3 (ref 0–0.05)
IMM GRANULOCYTES NFR BLD AUTO: 0.2 % (ref 0–0.5)
KETONES UR QL STRIP: NEGATIVE
LDLC SERPL CALC-MCNC: 52 MG/DL (ref 0–100)
LDLC/HDLC SERPL: 1.28 {RATIO}
LEUKOCYTE ESTERASE UR QL STRIP: NEGATIVE
LYMPHOCYTES # BLD AUTO: 1.6 10*3/MM3 (ref 0.7–3.1)
LYMPHOCYTES NFR BLD AUTO: 24.7 % (ref 19.6–45.3)
MCH RBC QN AUTO: 28.1 PG (ref 26.6–33)
MCHC RBC AUTO-ENTMCNC: 31.3 G/DL (ref 31.5–35.7)
MCV RBC AUTO: 89.6 FL (ref 79–97)
MONOCYTES # BLD AUTO: 0.67 10*3/MM3 (ref 0.1–0.9)
MONOCYTES NFR BLD AUTO: 10.4 % (ref 5–12)
NEUTROPHILS # BLD AUTO: 3.97 10*3/MM3 (ref 1.7–7)
NEUTROPHILS NFR BLD AUTO: 61.3 % (ref 42.7–76)
NITRITE UR QL STRIP: NEGATIVE
NRBC BLD AUTO-RTO: 0 /100 WBC (ref 0–0.2)
PH UR STRIP: 7 [PH] (ref 5–8)
PLATELET # BLD AUTO: 199 10*3/MM3 (ref 140–450)
POTASSIUM SERPL-SCNC: 4.4 MMOL/L (ref 3.5–5.2)
PROT SERPL-MCNC: 6.6 G/DL (ref 6–8.5)
PROT UR QL STRIP: NEGATIVE
PSA SERPL-MCNC: 1.48 NG/ML (ref 0–4)
RBC # BLD AUTO: 5.41 10*6/MM3 (ref 4.14–5.8)
RBC #/AREA URNS HPF: NORMAL /HPF
SODIUM SERPL-SCNC: 140 MMOL/L (ref 136–145)
SP GR UR: 1.01 (ref 1–1.03)
TRIGL SERPL-MCNC: 78 MG/DL (ref 0–150)
TSH SERPL DL<=0.005 MIU/L-ACNC: 2.04 MIU/ML (ref 0.27–4.2)
UROBILINOGEN UR STRIP-MCNC: (no result) MG/DL
VLDLC SERPL CALC-MCNC: 15.6 MG/DL
WBC # BLD AUTO: 6.47 10*3/MM3 (ref 3.4–10.8)
WBC #/AREA URNS HPF: NORMAL /HPF

## 2019-05-02 ENCOUNTER — OFFICE VISIT (OUTPATIENT)
Dept: FAMILY MEDICINE CLINIC | Facility: CLINIC | Age: 59
End: 2019-05-02

## 2019-05-02 VITALS
SYSTOLIC BLOOD PRESSURE: 108 MMHG | RESPIRATION RATE: 20 BRPM | BODY MASS INDEX: 34.72 KG/M2 | HEART RATE: 53 BPM | HEIGHT: 71 IN | WEIGHT: 248 LBS | TEMPERATURE: 97.8 F | DIASTOLIC BLOOD PRESSURE: 68 MMHG | OXYGEN SATURATION: 97 %

## 2019-05-02 DIAGNOSIS — I25.10 CORONARY ARTERIOSCLEROSIS: ICD-10-CM

## 2019-05-02 DIAGNOSIS — E78.5 HYPERLIPIDEMIA, UNSPECIFIED HYPERLIPIDEMIA TYPE: Primary | ICD-10-CM

## 2019-05-02 DIAGNOSIS — R73.01 ELEVATED FASTING BLOOD SUGAR: ICD-10-CM

## 2019-05-02 PROCEDURE — 99214 OFFICE O/P EST MOD 30 MIN: CPT | Performed by: INTERNAL MEDICINE

## 2019-05-02 NOTE — PROGRESS NOTES
Subjective   Kevin M Epley is a 58 y.o. male. Patient is here today for   Chief Complaint   Patient presents with   • Hyperlipidemia   • Hypertension          Vitals:    05/02/19 0853   BP: 108/68   Pulse: 53   Resp: 20   Temp: 97.8 °F (36.6 °C)   SpO2: 97%       The following portions of the patient's history were reviewed and updated as appropriate: allergies, current medications, past family history, past medical history, past social history, past surgical history and problem list.    Past Medical History:   Diagnosis Date   • Arthritis    • Bronchitis    • CAD (coronary artery disease)    • GERD (gastroesophageal reflux disease)    • History of heart attack    • Meckel's diverticulum    • Obesity    • Osteomyelitis of spine (CMS/HCC)    • Sinusitis       Allergies   Allergen Reactions   • Amlodipine Other (See Comments)     bradycardia   • Mushroom Extract Complex Nausea And Vomiting      Social History     Socioeconomic History   • Marital status:      Spouse name: Not on file   • Number of children: Not on file   • Years of education: Not on file   • Highest education level: Not on file   Tobacco Use   • Smoking status: Former Smoker     Packs/day: 0.50     Years: 24.00     Pack years: 12.00     Types: Cigarettes     Last attempt to quit: 03/2004     Years since quitting: 15.1   • Smokeless tobacco: Former User   Substance and Sexual Activity   • Alcohol use: Yes     Comment: SOCIAL   • Drug use: No   • Sexual activity: Defer        Current Outpatient Medications:   •  aspirin 81 MG tablet, Take 81 mg by mouth Daily., Disp: , Rfl:   •  atorvastatin (LIPITOR) 80 MG tablet, Take 80 mg by mouth Daily., Disp: , Rfl:   •  clopidogrel (PLAVIX) 75 MG tablet, Take 75 mg by mouth., Disp: , Rfl:   •  isosorbide mononitrate (IMDUR) 120 MG 24 hr tablet, Take 120 mg by mouth Daily., Disp: , Rfl:   •  Magnesium Hydroxide (MILK OF MAGNESIA PO), Take  by mouth Daily., Disp: , Rfl:   •  Multiple Vitamins tablet, Take 1  tablet by mouth Daily., Disp: , Rfl:   •  niacin (NIASPAN) 1000 MG CR tablet, Take 1,000 mg by mouth Every Night., Disp: , Rfl:   •  nitroglycerin (NITROSTAT) 0.4 MG SL tablet, Place 0.4 mg under the tongue Every 5 (Five) Minutes As Needed. Take no more than 3 doses in 15 minutes. , Disp: , Rfl:   •  Omega-3 Fatty Acids (FISH OIL PO), Take  by mouth., Disp: , Rfl:   •  VITAMIN E PO, Take 1 tablet by mouth Daily., Disp: , Rfl:   •  Zn-Pyg Afri-Nettle-Saw Palmet (SAW PALMETTO COMPLEX PO), Take  by mouth., Disp: , Rfl:      Objective   History of Present Illness Joseph is here for a blood pressure lab follow-up.  He has hypertension, hyperlipidemia, elevated fasting blood sugar, coronary artery disease, and obesity.  He eats healthy and exercises about 4 days a week.  His weight is unchanged in the last 6 months.  He has been having some ED issues and has been taking an over-the-counter testosterone supplement.  He is on isosorbide mononitrate.  He has not had vascular screening test recently.    Review of Systems   Constitutional: Negative for activity change and unexpected weight change.   Respiratory: Negative.    Cardiovascular: Negative for chest pain.   Genitourinary: Negative for difficulty urinating.   Neurological: Negative.    Psychiatric/Behavioral: Negative.        Physical Exam   Constitutional: He appears well-developed and well-nourished.   Neck: Carotid bruit is not present. No thyromegaly present.   Cardiovascular: Normal rate, regular rhythm and normal heart sounds.   122/62   Pulmonary/Chest: Effort normal and breath sounds normal.   Genitourinary:   Genitourinary Comments: Prostate is mildly enlarged   Musculoskeletal: He exhibits no edema.   Neurological: He is alert.   Psychiatric: He has a normal mood and affect. His behavior is normal. Judgment and thought content normal.   Vitals reviewed.      ASSESSMENT     Problem List Items Addressed This Visit        Cardiovascular and Mediastinum     Hyperlipidemia - Primary    Coronary arteriosclerosis       Other    Elevated fasting blood sugar          PLAN  Patient Instructions   Blood pressure is normal.  Fasting blood sugar is mildly elevated.  HDL cholesterol is 41 and LDL cholesterol is at goal at 52.  Kidney and liver functions are normal.  Thyroid-stimulating hormone level is normal.  Prostate-specific antigen is normal.  Continue diet, exercise, and current medicines.  Discussed losing some weight.  You need vascular screening tests.      Return in about 6 months (around 11/2/2019) for labslipid,bmp,a1c.

## 2019-05-02 NOTE — PATIENT INSTRUCTIONS
Blood pressure is normal.  Fasting blood sugar is mildly elevated.  HDL cholesterol is 41 and LDL cholesterol is at goal at 52.  Kidney and liver functions are normal.  Thyroid-stimulating hormone level is normal.  Prostate-specific antigen is normal.  Continue diet, exercise, and current medicines.  Discussed losing some weight.  You need vascular screening tests.

## 2019-06-10 RX ORDER — ATORVASTATIN CALCIUM 80 MG/1
80 TABLET, FILM COATED ORAL DAILY
Qty: 90 TABLET | Refills: 1 | Status: SHIPPED | OUTPATIENT
Start: 2019-06-10 | End: 2019-12-11 | Stop reason: SDUPTHER

## 2019-12-11 RX ORDER — ATORVASTATIN CALCIUM 80 MG/1
TABLET, FILM COATED ORAL
Qty: 90 TABLET | Refills: 0 | Status: SHIPPED | OUTPATIENT
Start: 2019-12-11 | End: 2020-03-20 | Stop reason: SDUPTHER

## 2020-03-20 RX ORDER — ATORVASTATIN CALCIUM 80 MG/1
80 TABLET, FILM COATED ORAL DAILY
Qty: 30 TABLET | Refills: 1 | Status: SHIPPED | OUTPATIENT
Start: 2020-03-20 | End: 2020-05-13

## 2020-04-02 DIAGNOSIS — D3A.098 CARCINOID TUMOR OF INTESTINE: Primary | ICD-10-CM

## 2020-04-16 ENCOUNTER — TELEHEALTH PROVIDED OTHER THAN IN PATIENT'S HOME (OUTPATIENT)
Dept: URBAN - METROPOLITAN AREA TELEHEALTH 5 | Facility: TELEHEALTH | Age: 60
End: 2020-04-16
Payer: COMMERCIAL

## 2020-04-16 DIAGNOSIS — K59.00 CONSTIPATION, UNSPECIFIED: ICD-10-CM

## 2020-04-16 DIAGNOSIS — Z86.010 PERSONAL HISTORY OF COLONIC POLYPS: ICD-10-CM

## 2020-04-16 DIAGNOSIS — R10.13 EPIGASTRIC PAIN: ICD-10-CM

## 2020-04-16 PROCEDURE — 99243 OFF/OP CNSLTJ NEW/EST LOW 30: CPT | Mod: 95 | Performed by: INTERNAL MEDICINE

## 2020-05-12 ENCOUNTER — TELEHEALTH PROVIDED OTHER THAN IN PATIENT'S HOME (OUTPATIENT)
Dept: URBAN - METROPOLITAN AREA TELEHEALTH 5 | Facility: TELEHEALTH | Age: 60
End: 2020-05-12
Payer: COMMERCIAL

## 2020-05-12 DIAGNOSIS — R10.84 GENERALIZED ABDOMINAL PAIN: ICD-10-CM

## 2020-05-12 DIAGNOSIS — R10.13 EPIGASTRIC PAIN: ICD-10-CM

## 2020-05-12 PROCEDURE — 99213 OFFICE O/P EST LOW 20 MIN: CPT | Mod: 95 | Performed by: INTERNAL MEDICINE

## 2020-05-12 RX ORDER — AMITRIPTYLINE HYDROCHLORIDE 50 MG/1
50 TABLET, FILM COATED ORAL
Qty: 30 | Refills: 5 | Status: ACTIVE
Start: 2020-05-12

## 2020-05-13 DIAGNOSIS — R73.01 ELEVATED FASTING BLOOD SUGAR: ICD-10-CM

## 2020-05-13 DIAGNOSIS — I10 ESSENTIAL HYPERTENSION: ICD-10-CM

## 2020-05-13 DIAGNOSIS — Z12.5 SCREENING PSA (PROSTATE SPECIFIC ANTIGEN): ICD-10-CM

## 2020-05-13 DIAGNOSIS — E78.5 HYPERLIPIDEMIA, UNSPECIFIED HYPERLIPIDEMIA TYPE: ICD-10-CM

## 2020-05-13 LAB
APPEARANCE UR: CLEAR
BACTERIA #/AREA URNS HPF: NORMAL /HPF
BASOPHILS # BLD AUTO: 0.04 10*3/MM3 (ref 0–0.2)
BASOPHILS NFR BLD AUTO: 0.7 % (ref 0–1.5)
BILIRUB UR QL STRIP: NEGATIVE
BUN SERPL-MCNC: 16 MG/DL (ref 6–20)
BUN/CREAT SERPL: 15.7 (ref 7–25)
CALCIUM SERPL-MCNC: 9 MG/DL (ref 8.6–10.5)
CASTS URNS MICRO: NORMAL
CHLORIDE SERPL-SCNC: 105 MMOL/L (ref 98–107)
CHOLEST SERPL-MCNC: 119 MG/DL (ref 0–200)
CO2 SERPL-SCNC: 26.8 MMOL/L (ref 22–29)
COLOR UR: YELLOW
CREAT SERPL-MCNC: 1.02 MG/DL (ref 0.76–1.27)
EOSINOPHIL # BLD AUTO: 0.11 10*3/MM3 (ref 0–0.4)
EOSINOPHIL NFR BLD AUTO: 1.8 % (ref 0.3–6.2)
EPI CELLS #/AREA URNS HPF: NORMAL /HPF
ERYTHROCYTE [DISTWIDTH] IN BLOOD BY AUTOMATED COUNT: 12.4 % (ref 12.3–15.4)
GLUCOSE SERPL-MCNC: 107 MG/DL (ref 65–99)
GLUCOSE UR QL: NEGATIVE
HBA1C MFR BLD: 5.2 % (ref 4.8–5.6)
HCT VFR BLD AUTO: 44.4 % (ref 37.5–51)
HDLC SERPL-MCNC: 45 MG/DL (ref 40–60)
HGB BLD-MCNC: 15 G/DL (ref 13–17.7)
HGB UR QL STRIP: NEGATIVE
IMM GRANULOCYTES # BLD AUTO: 0.02 10*3/MM3 (ref 0–0.05)
IMM GRANULOCYTES NFR BLD AUTO: 0.3 % (ref 0–0.5)
KETONES UR QL STRIP: NEGATIVE
LDLC SERPL CALC-MCNC: 63 MG/DL (ref 0–100)
LDLC/HDLC SERPL: 1.4 {RATIO}
LEUKOCYTE ESTERASE UR QL STRIP: NEGATIVE
LYMPHOCYTES # BLD AUTO: 1.65 10*3/MM3 (ref 0.7–3.1)
LYMPHOCYTES NFR BLD AUTO: 27 % (ref 19.6–45.3)
MCH RBC QN AUTO: 28.7 PG (ref 26.6–33)
MCHC RBC AUTO-ENTMCNC: 33.8 G/DL (ref 31.5–35.7)
MCV RBC AUTO: 84.9 FL (ref 79–97)
MONOCYTES # BLD AUTO: 0.59 10*3/MM3 (ref 0.1–0.9)
MONOCYTES NFR BLD AUTO: 9.6 % (ref 5–12)
NEUTROPHILS # BLD AUTO: 3.71 10*3/MM3 (ref 1.7–7)
NEUTROPHILS NFR BLD AUTO: 60.6 % (ref 42.7–76)
NITRITE UR QL STRIP: NEGATIVE
NRBC BLD AUTO-RTO: 0 /100 WBC (ref 0–0.2)
PH UR STRIP: 7 [PH] (ref 5–8)
PLATELET # BLD AUTO: 175 10*3/MM3 (ref 140–450)
POTASSIUM SERPL-SCNC: 4.6 MMOL/L (ref 3.5–5.2)
PROT UR QL STRIP: NEGATIVE
PSA SERPL-MCNC: 2.54 NG/ML (ref 0–4)
RBC # BLD AUTO: 5.23 10*6/MM3 (ref 4.14–5.8)
RBC #/AREA URNS HPF: NORMAL /HPF
SODIUM SERPL-SCNC: 142 MMOL/L (ref 136–145)
SP GR UR: 1.01 (ref 1–1.03)
TRIGL SERPL-MCNC: 56 MG/DL (ref 0–150)
TSH SERPL DL<=0.005 MIU/L-ACNC: 1.7 UIU/ML (ref 0.27–4.2)
UROBILINOGEN UR STRIP-MCNC: NORMAL MG/DL
VLDLC SERPL CALC-MCNC: 11.2 MG/DL
WBC # BLD AUTO: 6.12 10*3/MM3 (ref 3.4–10.8)
WBC #/AREA URNS HPF: NORMAL /HPF

## 2020-05-13 RX ORDER — ATORVASTATIN CALCIUM 80 MG/1
TABLET, FILM COATED ORAL
Qty: 30 TABLET | Refills: 0 | Status: SHIPPED | OUTPATIENT
Start: 2020-05-13 | End: 2020-06-11

## 2020-05-19 ENCOUNTER — OFFICE VISIT (OUTPATIENT)
Dept: FAMILY MEDICINE CLINIC | Facility: CLINIC | Age: 60
End: 2020-05-19

## 2020-05-19 VITALS
TEMPERATURE: 97.8 F | DIASTOLIC BLOOD PRESSURE: 80 MMHG | SYSTOLIC BLOOD PRESSURE: 130 MMHG | WEIGHT: 245 LBS | HEIGHT: 71 IN | OXYGEN SATURATION: 98 % | BODY MASS INDEX: 34.3 KG/M2 | RESPIRATION RATE: 16 BRPM | HEART RATE: 62 BPM

## 2020-05-19 DIAGNOSIS — R73.01 ELEVATED FASTING BLOOD SUGAR: ICD-10-CM

## 2020-05-19 DIAGNOSIS — I25.10 CORONARY ARTERIOSCLEROSIS: ICD-10-CM

## 2020-05-19 DIAGNOSIS — E78.5 HYPERLIPIDEMIA, UNSPECIFIED HYPERLIPIDEMIA TYPE: Primary | ICD-10-CM

## 2020-05-19 PROCEDURE — 99214 OFFICE O/P EST MOD 30 MIN: CPT | Performed by: INTERNAL MEDICINE

## 2020-05-19 RX ORDER — CLOPIDOGREL BISULFATE 75 MG/1
TABLET ORAL
COMMUNITY
Start: 2019-06-10

## 2020-05-19 RX ORDER — ISOSORBIDE MONONITRATE 60 MG/1
120 TABLET, EXTENDED RELEASE ORAL DAILY
COMMUNITY
Start: 2020-05-13

## 2020-05-19 RX ORDER — AMLODIPINE BESYLATE 2.5 MG/1
2.5 TABLET ORAL DAILY
COMMUNITY
Start: 2020-01-21 | End: 2020-07-06

## 2020-05-19 RX ORDER — AMITRIPTYLINE HYDROCHLORIDE 25 MG/1
TABLET, FILM COATED ORAL
COMMUNITY
Start: 2020-05-12 | End: 2022-05-19

## 2020-05-19 NOTE — PROGRESS NOTES
Subjective   Kevin M Epley is a 59 y.o. male. Patient is here today for   Chief Complaint   Patient presents with   • Hyperlipidemia          Vitals:    20 0903   BP: 130/80   Pulse: 62   Resp: 16   Temp: 97.8 °F (36.6 °C)   SpO2: 98%     Body mass index is 34.17 kg/m².      The following portions of the patient's history were reviewed and updated as appropriate: allergies, current medications, past family history, past medical history, past social history, past surgical history and problem list.    Past Medical History:   Diagnosis Date   • Arthritis    • Bronchitis    • CAD (coronary artery disease)    • GERD (gastroesophageal reflux disease)    • History of heart attack    • Meckel's diverticulum    • Obesity    • Osteomyelitis of spine (CMS/HCC)    • Sinusitis       Allergies   Allergen Reactions   • Amlodipine Other (See Comments)     bradycardia   • Mushroom Extract Complex Nausea And Vomiting   • Ranolazine Mental Status Change      Social History     Socioeconomic History   • Marital status:      Spouse name: Not on file   • Number of children: Not on file   • Years of education: Not on file   • Highest education level: Not on file   Tobacco Use   • Smoking status: Former Smoker     Packs/day: 0.50     Years: 24.00     Pack years: 12.00     Types: Cigarettes     Last attempt to quit: 2004     Years since quittin.2   • Smokeless tobacco: Former User   Substance and Sexual Activity   • Alcohol use: Yes     Comment: SOCIAL   • Drug use: No   • Sexual activity: Defer        Current Outpatient Medications:   •  amLODIPine (NORVASC) 2.5 MG tablet, Take 2.5 mg by mouth Daily., Disp: , Rfl:   •  clopidogrel (PLAVIX) 75 MG tablet, TAKE 1 TABLET BY MOUTH ONCE DAILY, Disp: , Rfl:   •  isosorbide mononitrate (IMDUR) 60 MG 24 hr tablet, Take 2 tablets by mouth once daily, Disp: , Rfl:   •  amitriptyline (ELAVIL) 25 MG tablet, TAKE 1 TABLET BY MOUTH AT BEDTIME FOR 30 DAYS, Disp: , Rfl:   •  aspirin 81  MG tablet, Take 81 mg by mouth Daily., Disp: , Rfl:   •  atorvastatin (LIPITOR) 80 MG tablet, TAKE ONE TABLET BY MOUTH DAILY PATIENT IS AWARE AN APPT IS NEEDED, Disp: 30 tablet, Rfl: 0  •  clopidogrel (PLAVIX) 75 MG tablet, Take 75 mg by mouth., Disp: , Rfl:   •  hyoscyamine (LEVSIN) 0.125 MG SL tablet, DISSOLVE 1 TABLET IN MOUTH THREE TIMES DAILY AS NEEDED FOR CRAMPS FOR 30 DAYS, Disp: , Rfl:   •  isosorbide mononitrate (IMDUR) 120 MG 24 hr tablet, Take 120 mg by mouth Daily., Disp: , Rfl:   •  Magnesium Hydroxide (MILK OF MAGNESIA PO), Take  by mouth Daily., Disp: , Rfl:   •  Multiple Vitamins tablet, Take 1 tablet by mouth Daily., Disp: , Rfl:   •  niacin (NIASPAN) 1000 MG CR tablet, Take 1,000 mg by mouth Every Night., Disp: , Rfl:   •  nitroglycerin (NITROSTAT) 0.4 MG SL tablet, Place 0.4 mg under the tongue Every 5 (Five) Minutes As Needed. Take no more than 3 doses in 15 minutes. , Disp: , Rfl:   •  Omega-3 Fatty Acids (FISH OIL PO), Take  by mouth., Disp: , Rfl:   •  VITAMIN E PO, Take 1 tablet by mouth Daily., Disp: , Rfl:   •  Zn-Pyg Afri-Nettle-Saw Palmet (SAW PALMETTO COMPLEX PO), Take  by mouth., Disp: , Rfl:      Objective   History of Present Illness Joseph is here for a blood pressure check and lab follow-up.  He has coronary artery disease, hyperlipidemia, elevated fasting glucose, obesity.  He currently feels well.  He eats healthy but is not been exercising.  His weight is unchanged in last year.  He developed unstable angina and had a cardiac catheterization and stent placement at [facility] in January.  He also has a history of carcinoid tumor of the intestines and recently saw gastroenterology who plans an EGD and colonoscopy.  He has not had recent vascular screening test.    Review of Systems   Constitutional: Negative for activity change and unexpected weight change.   Respiratory: Negative for cough and shortness of breath.    Cardiovascular: Negative for chest pain and leg swelling.    Gastrointestinal: Positive for abdominal pain.   Neurological: Negative.    Psychiatric/Behavioral: Negative.        Physical Exam   Constitutional: He appears well-developed and well-nourished.   Cardiovascular: Normal rate, regular rhythm and normal heart sounds.   116/70   Pulmonary/Chest: Effort normal and breath sounds normal.   Genitourinary: Prostate normal.   Musculoskeletal: He exhibits no edema.   Neurological: He is alert.   Psychiatric: He has a normal mood and affect. His behavior is normal. Judgment and thought content normal.   Vitals reviewed.      ASSESSMENT     Problem List Items Addressed This Visit        Cardiovascular and Mediastinum    Hyperlipidemia - Primary    Coronary arteriosclerosis    Relevant Medications    amLODIPine (NORVASC) 2.5 MG tablet    clopidogrel (PLAVIX) 75 MG tablet    isosorbide mononitrate (IMDUR) 60 MG 24 hr tablet       Other    Elevated fasting blood sugar          PLAN  Patient Instructions   Blood pressure remains normal.  Fasting blood sugar is mildly elevated at 107 and hemoglobin A1c is normal at 5.2.  HDL cholesterol is normal and LDL cholesterol is at goal.  A complete blood count, thyroid-stimulating hormone level, kidney functions, and liver functions are normal.  Urinalysis is normal.  Prostate-specific antigen is normal.  Discussed importance of a regular cardiovascular exercise program per AHA guidelines.  Suggest getting vascular screening test.  We will continue current medicines.      Return in about 1 year (around 5/19/2021) for labsCBC,CMP,LIPID,A1C,TSH,PSA.

## 2020-05-19 NOTE — PATIENT INSTRUCTIONS
Blood pressure remains normal.  Fasting blood sugar is mildly elevated at 107 and hemoglobin A1c is normal at 5.2.  HDL cholesterol is normal and LDL cholesterol is at goal.  A complete blood count, thyroid-stimulating hormone level, kidney functions, and liver functions are normal.  Urinalysis is normal.  Prostate-specific antigen is normal.  Discussed importance of a regular cardiovascular exercise program per AHA guidelines.  Suggest getting vascular screening test.  We will continue current medicines.

## 2020-06-11 RX ORDER — ATORVASTATIN CALCIUM 80 MG/1
80 TABLET, FILM COATED ORAL DAILY
Qty: 30 TABLET | Refills: 5 | Status: SHIPPED | OUTPATIENT
Start: 2020-06-11 | End: 2020-12-14

## 2020-06-24 ENCOUNTER — TRANSCRIBE ORDERS (OUTPATIENT)
Dept: SLEEP MEDICINE | Facility: HOSPITAL | Age: 60
End: 2020-06-24

## 2020-06-24 ENCOUNTER — LAB (OUTPATIENT)
Dept: LAB | Facility: HOSPITAL | Age: 60
End: 2020-06-24

## 2020-06-24 DIAGNOSIS — Z01.818 OTHER SPECIFIED PRE-OPERATIVE EXAMINATION: ICD-10-CM

## 2020-06-24 DIAGNOSIS — Z01.818 OTHER SPECIFIED PRE-OPERATIVE EXAMINATION: Primary | ICD-10-CM

## 2020-06-24 PROCEDURE — U0004 COV-19 TEST NON-CDC HGH THRU: HCPCS

## 2020-06-25 LAB
REF LAB TEST METHOD: NORMAL
SARS-COV-2 RNA RESP QL NAA+PROBE: NOT DETECTED

## 2020-06-26 ENCOUNTER — HOSPITAL ENCOUNTER (OUTPATIENT)
Facility: HOSPITAL | Age: 60
Setting detail: HOSPITAL OUTPATIENT SURGERY
Discharge: HOME | End: 2020-06-26
Attending: INTERNAL MEDICINE | Admitting: INTERNAL MEDICINE

## 2020-06-26 ENCOUNTER — ANESTHESIA (OUTPATIENT)
Dept: GASTROENTEROLOGY | Facility: HOSPITAL | Age: 60
End: 2020-06-26

## 2020-06-26 ENCOUNTER — ANESTHESIA EVENT (OUTPATIENT)
Dept: GASTROENTEROLOGY | Facility: HOSPITAL | Age: 60
End: 2020-06-26

## 2020-06-26 ENCOUNTER — ON CAMPUS - OUTPATIENT (OUTPATIENT)
Dept: URBAN - METROPOLITAN AREA HOSPITAL 114 | Facility: HOSPITAL | Age: 60
End: 2020-06-26
Payer: COMMERCIAL

## 2020-06-26 VITALS
HEIGHT: 71 IN | HEART RATE: 60 BPM | SYSTOLIC BLOOD PRESSURE: 117 MMHG | TEMPERATURE: 97.6 F | DIASTOLIC BLOOD PRESSURE: 71 MMHG | BODY MASS INDEX: 34.02 KG/M2 | RESPIRATION RATE: 18 BRPM | OXYGEN SATURATION: 100 % | WEIGHT: 243 LBS

## 2020-06-26 DIAGNOSIS — D12.2 BENIGN NEOPLASM OF ASCENDING COLON: ICD-10-CM

## 2020-06-26 DIAGNOSIS — K92.1 MELENA: ICD-10-CM

## 2020-06-26 DIAGNOSIS — D62 ACUTE POSTHEMORRHAGIC ANEMIA: ICD-10-CM

## 2020-06-26 DIAGNOSIS — R10.9 ABDOMINAL PAIN: ICD-10-CM

## 2020-06-26 DIAGNOSIS — R10.9 UNSPECIFIED ABDOMINAL PAIN: ICD-10-CM

## 2020-06-26 DIAGNOSIS — K29.50 UNSPECIFIED CHRONIC GASTRITIS WITHOUT BLEEDING: ICD-10-CM

## 2020-06-26 DIAGNOSIS — K44.9 DIAPHRAGMATIC HERNIA WITHOUT OBSTRUCTION OR GANGRENE: ICD-10-CM

## 2020-06-26 LAB
ALBUMIN SERPL-MCNC: 3.3 G/DL (ref 3.5–5.2)
ALBUMIN/GLOB SERPL: 1.8 G/DL
ALP SERPL-CCNC: 51 U/L (ref 39–117)
ALT SERPL W P-5'-P-CCNC: 23 U/L (ref 1–41)
ANION GAP SERPL CALCULATED.3IONS-SCNC: 7.4 MMOL/L (ref 5–15)
AST SERPL-CCNC: 15 U/L (ref 1–40)
BILIRUB SERPL-MCNC: 0.4 MG/DL (ref 0.2–1.2)
BUN BLD-MCNC: 6 MG/DL (ref 6–20)
BUN/CREAT SERPL: 6.5 (ref 7–25)
CALCIUM SPEC-SCNC: 7.6 MG/DL (ref 8.6–10.5)
CHLORIDE SERPL-SCNC: 111 MMOL/L (ref 98–107)
CO2 SERPL-SCNC: 22.6 MMOL/L (ref 22–29)
CREAT BLD-MCNC: 0.93 MG/DL (ref 0.76–1.27)
DEPRECATED RDW RBC AUTO: 40.8 FL (ref 37–54)
ERYTHROCYTE [DISTWIDTH] IN BLOOD BY AUTOMATED COUNT: 13.2 % (ref 12.3–15.4)
GFR SERPL CREATININE-BSD FRML MDRD: 83 ML/MIN/1.73
GLOBULIN UR ELPH-MCNC: 1.8 GM/DL
GLUCOSE BLD-MCNC: 100 MG/DL (ref 65–99)
HCT VFR BLD AUTO: 27.6 % (ref 37.5–51)
HGB BLD-MCNC: 9.4 G/DL (ref 13–17.7)
MCH RBC QN AUTO: 29.5 PG (ref 26.6–33)
MCHC RBC AUTO-ENTMCNC: 34.1 G/DL (ref 31.5–35.7)
MCV RBC AUTO: 86.5 FL (ref 79–97)
PLATELET # BLD AUTO: 145 10*3/MM3 (ref 140–450)
PMV BLD AUTO: 9.5 FL (ref 6–12)
POTASSIUM BLD-SCNC: 4.3 MMOL/L (ref 3.5–5.2)
PROT SERPL-MCNC: 5.1 G/DL (ref 6–8.5)
RBC # BLD AUTO: 3.19 10*6/MM3 (ref 4.14–5.8)
SODIUM BLD-SCNC: 141 MMOL/L (ref 136–145)
WBC NRBC COR # BLD: 5.65 10*3/MM3 (ref 3.4–10.8)

## 2020-06-26 PROCEDURE — 88305 TISSUE EXAM BY PATHOLOGIST: CPT | Performed by: INTERNAL MEDICINE

## 2020-06-26 PROCEDURE — 85027 COMPLETE CBC AUTOMATED: CPT | Performed by: INTERNAL MEDICINE

## 2020-06-26 PROCEDURE — 45380 COLONOSCOPY AND BIOPSY: CPT | Performed by: INTERNAL MEDICINE

## 2020-06-26 PROCEDURE — 80053 COMPREHEN METABOLIC PANEL: CPT | Performed by: INTERNAL MEDICINE

## 2020-06-26 PROCEDURE — 43239 EGD BIOPSY SINGLE/MULTIPLE: CPT | Performed by: INTERNAL MEDICINE

## 2020-06-26 PROCEDURE — 25010000002 PROPOFOL 10 MG/ML EMULSION: Performed by: ANESTHESIOLOGY

## 2020-06-26 RX ORDER — PROPOFOL 10 MG/ML
VIAL (ML) INTRAVENOUS CONTINUOUS PRN
Status: DISCONTINUED | OUTPATIENT
Start: 2020-06-26 | End: 2020-06-26 | Stop reason: SURG

## 2020-06-26 RX ORDER — LIDOCAINE HYDROCHLORIDE 20 MG/ML
INJECTION, SOLUTION INFILTRATION; PERINEURAL AS NEEDED
Status: DISCONTINUED | OUTPATIENT
Start: 2020-06-26 | End: 2020-06-26 | Stop reason: SURG

## 2020-06-26 RX ORDER — PROPOFOL 10 MG/ML
VIAL (ML) INTRAVENOUS AS NEEDED
Status: DISCONTINUED | OUTPATIENT
Start: 2020-06-26 | End: 2020-06-26 | Stop reason: SURG

## 2020-06-26 RX ORDER — SODIUM CHLORIDE, SODIUM LACTATE, POTASSIUM CHLORIDE, CALCIUM CHLORIDE 600; 310; 30; 20 MG/100ML; MG/100ML; MG/100ML; MG/100ML
30 INJECTION, SOLUTION INTRAVENOUS CONTINUOUS PRN
Status: DISCONTINUED | OUTPATIENT
Start: 2020-06-26 | End: 2020-06-26 | Stop reason: HOSPADM

## 2020-06-26 RX ORDER — GLYCOPYRROLATE 0.2 MG/ML
INJECTION INTRAMUSCULAR; INTRAVENOUS AS NEEDED
Status: DISCONTINUED | OUTPATIENT
Start: 2020-06-26 | End: 2020-06-26 | Stop reason: SURG

## 2020-06-26 RX ADMIN — PROPOFOL 100 MCG/KG/MIN: 10 INJECTION, EMULSION INTRAVENOUS at 07:13

## 2020-06-26 RX ADMIN — LIDOCAINE HYDROCHLORIDE 60 MG: 20 INJECTION, SOLUTION INFILTRATION; PERINEURAL at 07:13

## 2020-06-26 RX ADMIN — SODIUM CHLORIDE, POTASSIUM CHLORIDE, SODIUM LACTATE AND CALCIUM CHLORIDE 30 ML/HR: 600; 310; 30; 20 INJECTION, SOLUTION INTRAVENOUS at 06:50

## 2020-06-26 RX ADMIN — GLYCOPYRROLATE 0.2 MCG: 0.2 INJECTION INTRAMUSCULAR; INTRAVENOUS at 07:12

## 2020-06-26 RX ADMIN — PROPOFOL 100 MG: 10 INJECTION, EMULSION INTRAVENOUS at 07:13

## 2020-06-26 NOTE — H&P
Patient Care Team:  Bob Bonner MD as PCP - General  Bob Bonner MD as PCP - Family Medicine    Chief complaint melena, anemia     Subjective     History of Present Illness  Patient has had upper abdominal pain, sometimes with eating, this has improved with low dose elavil.  Has been to [facility], not had deep enteroscopy as of yet  He has had hgb decrease 13-10  Less dizzy. Black stool stopped thankfully   Review of Systems   Gastrointestinal: Positive for abdominal pain.   All other systems reviewed and are negative.       Past Medical History:   Diagnosis Date   • Arthritis    • Bronchitis    • CAD (coronary artery disease)    • GERD (gastroesophageal reflux disease)    • History of heart attack    • Meckel's diverticulum    • Obesity    • Osteomyelitis of spine (CMS/HCC)    • Sinusitis      Past Surgical History:   Procedure Laterality Date   • CARDIAC CATHETERIZATION N/A 09/24/2015    LM nl, 20% pLAD, 20% mLAD, 90% pLCx, 90% ostial OM1, 30% mid RCA luminal irregularities (dominant RCA), EF 45%   • CATARACT EXTRACTION     • CHOLECYSTECTOMY     • COLONOSCOPY  2010   • COLONOSCOPY N/A 02/19/2016    IH, diverticula in the sigmoid and descending colon, 9 mm polyp in the cecum (PATH: TUBULAR ADENOMA), 5 mm polyp in the ascending colon; Dr. Pavan Rock   • CORONARY ANGIOPLASTY WITH STENT PLACEMENT N/A 02/27/2004    Cath Left Ventriculograpy, Coronary Angiography, Coronary Drug Eluting Stent, Coronary PTCA, Intravascular Ultrasound & Left Heart Catheterization, Dr. Boris Martin   • ENDOSCOPY N/A 09/30/2015    Mild Duodenal dilation, Dr. Velasquez Claros   • ENDOSCOPY N/A 11/8/2016    Procedure: ESOPHAGOGASTRODUODENOSCOPY to Jejunum with biopsy;  Surgeon: Goldy Adams MD;  Location: [facility] ENDOSCOPY;  Service:    • INGUINAL HERNIA REPAIR     • MECKEL DIVERTICULUM EXCISION N/A 12/10/2015    Laparoscopic adhesiolysis, laparoscopic Meckel's diverticulum; Dr. Velasquez Claros   • SHOULDER SURGERY      • THORACIC FUSION      T4-T6 fusion   • UPPER GASTROINTESTINAL ENDOSCOPY N/A 10/16/2014    Normal esophagus and stomach; Dr. Goldy Adams   • UPPER GASTROINTESTINAL ENDOSCOPY N/A 2011    LA Grade A reflux esophagitis, normal stomach, normal duodenum, dilation attempted in the gastroesophageal junction, successful; Dr. Goldy Adams     Family History   Problem Relation Age of Onset   • Diabetes Father    • Heart disease Other    • Stomach cancer Maternal Aunt    • Breast cancer Paternal Grandmother      Social History     Tobacco Use   • Smoking status: Former Smoker     Packs/day: 0.50     Years: 24.00     Pack years: 12.00     Types: Cigarettes     Last attempt to quit: 2004     Years since quittin.3   • Smokeless tobacco: Former User   Substance Use Topics   • Alcohol use: Yes     Comment: SOCIAL   • Drug use: No     No medications prior to admission.     Allergies:  Amlodipine; Mushroom extract complex; and Ranolazine    Objective      Vital Signs  Temp:  [97.6 °F (36.4 °C)] 97.6 °F (36.4 °C)  Heart Rate:  [65] 65  Resp:  [16] 16  BP: (142)/(82) 142/82    Physical Exam   Constitutional: He is oriented to person, place, and time. He appears well-developed and well-nourished.   HENT:   Mouth/Throat: Oropharynx is clear and moist.   Eyes: Conjunctivae are normal.   Neck: Neck supple.   Cardiovascular: Normal rate and regular rhythm.   Pulmonary/Chest: Effort normal and breath sounds normal.   Abdominal: Soft. Bowel sounds are normal.   Neurological: He is alert and oriented to person, place, and time.   Skin: Skin is warm and dry.   Psychiatric: He has a normal mood and affect.       Results Review:   I reviewed the patient's new clinical results.  I reviewed the patient's other test results and agree with the interpretation      Assessment/Plan       * No active hospital problems. *      Assessment:  (Melena  Anemia  Abdominal pain suspect median arcuate ligament syndrome, functional  Abdominal  pain ).     Plan:   (Upper and lower tract endoscopy, risks, alternatives and benefits dicussed  with patient and patient is agreeable to proceed.).       I discussed the patients findings and my recommendations with patient and nursing staff    Gabino Jarvis MD  06/26/20  07:18

## 2020-06-26 NOTE — ANESTHESIA PREPROCEDURE EVALUATION
Anesthesia Evaluation     Patient summary reviewed and Nursing notes reviewed                Airway   Mallampati: I  TM distance: >3 FB  Neck ROM: full  No difficulty expected  Dental - normal exam     Pulmonary - negative pulmonary ROS and normal exam   Cardiovascular - normal exam    (+) CAD, cardiac stents hyperlipidemia,       Neuro/Psych- negative ROS  GI/Hepatic/Renal/Endo    (+) obesity,  GERD,    (-) morbid obesity    Musculoskeletal     Abdominal  - normal exam    Bowel sounds: normal.   Substance History - negative use     OB/GYN negative ob/gyn ROS         Other   arthritis,                      Anesthesia Plan    ASA 3     MAC       Anesthetic plan, all risks, benefits, and alternatives have been provided, discussed and informed consent has been obtained with: patient.

## 2020-06-26 NOTE — ANESTHESIA POSTPROCEDURE EVALUATION
"Patient: Kevin M Epley    Procedure Summary     Date:  06/26/20 Room / Location:  [facility] ENDOSCOPY 4 /  [facility]    Anesthesia Start:  0703 Anesthesia Stop:  0805    Procedures:       ESOPHAGOGASTRODUODENOSCOPY WITH BX (N/A Esophagus)      COLONOSCOPY TO TERMINAL ILEUM WITH POLYPECTOMY (COLD) (N/A ) Diagnosis:      Surgeon:  Gabino Jarvis MD Provider:  Jean Marie Romeo MD    Anesthesia Type:  MAC ASA Status:  3          Anesthesia Type: MAC    Vitals  Vitals Value Taken Time   /71 6/26/2020  8:23 AM   Temp     Pulse 60 6/26/2020  8:23 AM   Resp 18 6/26/2020  8:23 AM   SpO2 100 % 6/26/2020  8:23 AM           Post Anesthesia Care and Evaluation    Patient location during evaluation: PACU  Patient participation: complete - patient participated  Level of consciousness: awake  Pain score: 0  Pain management: adequate  Airway patency: patent  Anesthetic complications: No anesthetic complications  PONV Status: none  Cardiovascular status: acceptable  Respiratory status: acceptable  Hydration status: acceptable    Comments: /71   Pulse 60   Temp 36.4 °C (97.6 °F) (Oral)   Resp 18   Ht 180.3 cm (71\")   Wt 110 kg (243 lb)   SpO2 100%   BMI 33.89 kg/m²       "

## 2020-06-29 LAB
CYTO UR: NORMAL
LAB AP CASE REPORT: NORMAL
PATH REPORT.FINAL DX SPEC: NORMAL
PATH REPORT.GROSS SPEC: NORMAL

## 2020-07-06 ENCOUNTER — OFFICE VISIT (OUTPATIENT)
Dept: FAMILY MEDICINE CLINIC | Facility: CLINIC | Age: 60
End: 2020-07-06

## 2020-07-06 VITALS
BODY MASS INDEX: 33.46 KG/M2 | WEIGHT: 239 LBS | RESPIRATION RATE: 17 BRPM | SYSTOLIC BLOOD PRESSURE: 122 MMHG | DIASTOLIC BLOOD PRESSURE: 70 MMHG | OXYGEN SATURATION: 99 % | HEIGHT: 71 IN | TEMPERATURE: 96.4 F | HEART RATE: 73 BPM

## 2020-07-06 DIAGNOSIS — D64.9 ANEMIA, UNSPECIFIED TYPE: ICD-10-CM

## 2020-07-06 DIAGNOSIS — R10.84 GENERALIZED ABDOMINAL PAIN: Primary | ICD-10-CM

## 2020-07-06 PROCEDURE — 99213 OFFICE O/P EST LOW 20 MIN: CPT | Performed by: INTERNAL MEDICINE

## 2020-07-06 NOTE — PATIENT INSTRUCTIONS
Reviewed and discussed endoscopy reports as well as pathology reports.  We will check an iron profile and serum ferritin today.  Discussed starting a gastric acid suppressant.

## 2020-07-06 NOTE — PROGRESS NOTES
Subjective   Kevin M Epley is a 59 y.o. male. Patient is here today for   Chief Complaint   Patient presents with   • Fatigue     x 2 weeks          Vitals:    20 0806   BP: 122/70   Pulse: 73   Resp: 17   Temp: 96.4 °F (35.8 °C)   SpO2: 99%     Body mass index is 33.33 kg/m².    The following portions of the patient's history were reviewed and updated as appropriate: allergies, current medications, past family history, past medical history, past social history, past surgical history and problem list.    Past Medical History:   Diagnosis Date   • Arthritis    • Bronchitis    • CAD (coronary artery disease)    • GERD (gastroesophageal reflux disease)    • History of heart attack    • Meckel's diverticulum    • Obesity    • Osteomyelitis of spine (CMS/HCC)    • Sinusitis       Allergies   Allergen Reactions   • Amlodipine Other (See Comments)     bradycardia   • Mushroom Extract Complex Nausea And Vomiting   • Ranolazine Mental Status Change      Social History     Socioeconomic History   • Marital status:      Spouse name: Not on file   • Number of children: Not on file   • Years of education: Not on file   • Highest education level: Not on file   Tobacco Use   • Smoking status: Former Smoker     Packs/day: 0.50     Years: 24.00     Pack years: 12.00     Types: Cigarettes     Last attempt to quit: 2004     Years since quittin.3   • Smokeless tobacco: Former User   Substance and Sexual Activity   • Alcohol use: Yes     Comment: SOCIAL   • Drug use: No   • Sexual activity: Defer        Current Outpatient Medications:   •  amitriptyline (ELAVIL) 25 MG tablet, TAKE 1 TABLET BY MOUTH AT BEDTIME FOR 30 DAYS, Disp: , Rfl:   •  aspirin 81 MG tablet, Take 81 mg by mouth Daily., Disp: , Rfl:   •  atorvastatin (LIPITOR) 80 MG tablet, Take 1 tablet by mouth Daily., Disp: 30 tablet, Rfl: 5  •  clopidogrel (PLAVIX) 75 MG tablet, TAKE 1 TABLET BY MOUTH ONCE DAILY, Disp: , Rfl:   •  hyoscyamine (LEVSIN) 0.125 MG  SL tablet, DISSOLVE 1 TABLET IN MOUTH THREE TIMES DAILY AS NEEDED FOR CRAMPS FOR 30 DAYS, Disp: , Rfl:   •  isosorbide mononitrate (IMDUR) 60 MG 24 hr tablet, Take 2 tablets by mouth once daily, Disp: , Rfl:   •  Magnesium Hydroxide (MILK OF MAGNESIA PO), Take  by mouth Daily., Disp: , Rfl:   •  niacin (NIASPAN) 1000 MG CR tablet, Take 1,000 mg by mouth Every Night., Disp: , Rfl:   •  nitroglycerin (NITROSTAT) 0.4 MG SL tablet, Place 0.4 mg under the tongue Every 5 (Five) Minutes As Needed. Take no more than 3 doses in 15 minutes. , Disp: , Rfl:   •  Omega-3 Fatty Acids (FISH OIL PO), Take  by mouth., Disp: , Rfl:   •  Zn-Pyg Afri-Nettle-Saw Palmet (SAW PALMETTO COMPLEX PO), Take  by mouth., Disp: , Rfl:      Objective     History of Present Illness Joseph has chronic upper abdominal pain and recently underwent an EGD and colonoscopy by Dr. Sanchez.  Gastric biopsy revealed chronic gastritis without active bleeding.  He also was found to have an a sending colon tubular adenoma.  Hemoglobin prior to endoscopy was 9.4.  He noted black stools prior to endoscopy for few days.  He has coronary artery disease and has been on aspirin and Plavix.  He held both medicines prior to endoscopy but resume them the day of endoscopy.  He currently is not on any gastric acid suppressants.  He has pain on a daily basis which is severe at times.  He is scheduled for a video capsule.  A repeat CBC 10 days ago showed a hemoglobin of 10.5.  He has been taking 1 over-the-counter iron pill daily.    Review of Systems   Constitutional: Negative for unexpected weight change.   Respiratory: Negative.    Cardiovascular: Negative.    Gastrointestinal: Positive for abdominal pain and nausea. Negative for vomiting.       Physical Exam   Constitutional: He appears well-developed and well-nourished.   Abdominal: Soft. Bowel sounds are normal. There is no tenderness. There is no guarding.   Vitals reviewed.      ASSESSMENT     Problem List Items  Addressed This Visit        Nervous and Auditory    Generalized abdominal pain - Primary       Hematopoietic and Hemostatic    Anemia    Relevant Orders    Iron Profile    Ferritin    CBC & Differential          PLAN  Patient Instructions   Reviewed and discussed endoscopy reports as well as pathology reports.  We will check an iron profile and serum ferritin today.  Discussed starting a gastric acid suppressant.    No follow-ups on file.

## 2020-07-07 LAB
BASOPHILS # BLD AUTO: 0.04 10*3/MM3 (ref 0–0.2)
BASOPHILS NFR BLD AUTO: 0.5 % (ref 0–1.5)
EOSINOPHIL # BLD AUTO: 0.24 10*3/MM3 (ref 0–0.4)
EOSINOPHIL NFR BLD AUTO: 3.1 % (ref 0.3–6.2)
ERYTHROCYTE [DISTWIDTH] IN BLOOD BY AUTOMATED COUNT: 13.9 % (ref 12.3–15.4)
FERRITIN SERPL-MCNC: 60.8 NG/ML (ref 30–400)
HCT VFR BLD AUTO: 37.7 % (ref 37.5–51)
HGB BLD-MCNC: 12.5 G/DL (ref 13–17.7)
IMM GRANULOCYTES # BLD AUTO: 0.02 10*3/MM3 (ref 0–0.05)
IMM GRANULOCYTES NFR BLD AUTO: 0.3 % (ref 0–0.5)
IRON SATN MFR SERPL: 17 % (ref 20–50)
IRON SERPL-MCNC: 60 MCG/DL (ref 59–158)
LYMPHOCYTES # BLD AUTO: 2.21 10*3/MM3 (ref 0.7–3.1)
LYMPHOCYTES NFR BLD AUTO: 28.8 % (ref 19.6–45.3)
MCH RBC QN AUTO: 29.1 PG (ref 26.6–33)
MCHC RBC AUTO-ENTMCNC: 33.2 G/DL (ref 31.5–35.7)
MCV RBC AUTO: 87.7 FL (ref 79–97)
MONOCYTES # BLD AUTO: 0.7 10*3/MM3 (ref 0.1–0.9)
MONOCYTES NFR BLD AUTO: 9.1 % (ref 5–12)
NEUTROPHILS # BLD AUTO: 4.47 10*3/MM3 (ref 1.7–7)
NEUTROPHILS NFR BLD AUTO: 58.2 % (ref 42.7–76)
NRBC BLD AUTO-RTO: 0 /100 WBC (ref 0–0.2)
PLATELET # BLD AUTO: 215 10*3/MM3 (ref 140–450)
RBC # BLD AUTO: 4.3 10*6/MM3 (ref 4.14–5.8)
TIBC SERPL-MCNC: 352 MCG/DL
UIBC SERPL-MCNC: 292 MCG/DL (ref 112–346)
WBC # BLD AUTO: 7.68 10*3/MM3 (ref 3.4–10.8)

## 2020-09-08 ENCOUNTER — ON CAMPUS - OUTPATIENT (OUTPATIENT)
Dept: URBAN - METROPOLITAN AREA HOSPITAL 108 | Facility: HOSPITAL | Age: 60
End: 2020-09-08

## 2020-09-08 DIAGNOSIS — K92.1 MELENA: ICD-10-CM

## 2020-09-08 DIAGNOSIS — R10.13 EPIGASTRIC PAIN: ICD-10-CM

## 2020-09-08 DIAGNOSIS — T18.2XXA FOREIGN BODY IN STOMACH, INITIAL ENCOUNTER: ICD-10-CM

## 2020-09-08 DIAGNOSIS — K55.20 ANGIODYSPLASIA OF COLON WITHOUT HEMORRHAGE: ICD-10-CM

## 2020-09-08 DIAGNOSIS — K31.89 OTHER DISEASES OF STOMACH AND DUODENUM: ICD-10-CM

## 2020-09-08 PROCEDURE — 44369 SMALL BOWEL ENDOSCOPY: CPT

## 2020-11-11 ENCOUNTER — OFFICE (OUTPATIENT)
Dept: URBAN - METROPOLITAN AREA CLINIC 66 | Facility: CLINIC | Age: 60
End: 2020-11-11

## 2020-11-11 VITALS
TEMPERATURE: 97.3 F | HEIGHT: 71 IN | HEART RATE: 69 BPM | SYSTOLIC BLOOD PRESSURE: 111 MMHG | DIASTOLIC BLOOD PRESSURE: 64 MMHG | WEIGHT: 254 LBS

## 2020-11-11 DIAGNOSIS — Z86.012 PERSONAL HISTORY OF BENIGN CARCINOID TUMOR: ICD-10-CM

## 2020-11-11 DIAGNOSIS — R10.84 GENERALIZED ABDOMINAL PAIN: ICD-10-CM

## 2020-11-11 DIAGNOSIS — Z86.010 PERSONAL HISTORY OF COLONIC POLYPS: ICD-10-CM

## 2020-11-11 PROCEDURE — 99214 OFFICE O/P EST MOD 30 MIN: CPT | Performed by: INTERNAL MEDICINE

## 2020-11-11 RX ORDER — AMITRIPTYLINE HYDROCHLORIDE 50 MG/1
50 TABLET, FILM COATED ORAL
Qty: 30 | Refills: 5 | Status: ACTIVE
Start: 2020-05-12

## 2020-11-16 ENCOUNTER — TRANSCRIBE ORDERS (OUTPATIENT)
Dept: ADMINISTRATIVE | Facility: HOSPITAL | Age: 60
End: 2020-11-16

## 2020-11-20 ENCOUNTER — TRANSCRIBE ORDERS (OUTPATIENT)
Dept: ADMINISTRATIVE | Facility: HOSPITAL | Age: 60
End: 2020-11-20

## 2020-11-20 DIAGNOSIS — R10.9 FUNCTIONAL ABDOMINAL PAIN SYNDROME: Primary | ICD-10-CM

## 2020-11-20 DIAGNOSIS — R10.84 ABDOMINAL PAIN, GENERALIZED: ICD-10-CM

## 2020-11-25 ENCOUNTER — APPOINTMENT (OUTPATIENT)
Dept: CARDIOLOGY | Facility: HOSPITAL | Age: 60
End: 2020-11-25

## 2020-12-07 ENCOUNTER — APPOINTMENT (OUTPATIENT)
Dept: CT IMAGING | Facility: HOSPITAL | Age: 60
End: 2020-12-07

## 2020-12-09 ENCOUNTER — HOSPITAL ENCOUNTER (OUTPATIENT)
Dept: CARDIOLOGY | Facility: HOSPITAL | Age: 60
Discharge: HOME | End: 2020-12-09

## 2020-12-09 ENCOUNTER — HOSPITAL ENCOUNTER (OUTPATIENT)
Dept: CT IMAGING | Facility: HOSPITAL | Age: 60
Discharge: HOME | End: 2020-12-09

## 2020-12-09 DIAGNOSIS — R10.9 FUNCTIONAL ABDOMINAL PAIN SYNDROME: ICD-10-CM

## 2020-12-09 DIAGNOSIS — R10.84 ABDOMINAL PAIN, GENERALIZED: ICD-10-CM

## 2020-12-09 LAB
BH CV VAS SMA 1ST PP TIME: 15 MIN
BH CV VAS SMA 2ND PP TIME: 30 MIN
BH CV VAS SMA 3RD PP TIME: 45 MIN
BH CV VAS SMA AORTA PSV: 70 CM/S
BH CV VAS SMA CELIAC DIST EDV: 11 CM/S
BH CV VAS SMA CELIAC DIST PSV: 44 CM/S
BH CV VAS SMA CELIAC ORIGIN EDV: 24 CM/S
BH CV VAS SMA CELIAC ORIGIN PSV: 112 CM/S
BH CV VAS SMA CELIAC PROX EDV: 34 CM/S
BH CV VAS SMA CELIAC PROX PSV: 160 CM/S
BH CV VAS SMA HEPATIC EDV: 31.4 CM/S
BH CV VAS SMA HEPATIC PSV: 80 CM/S
BH CV VAS SMA IMA EDV: 17.4 CM/S
BH CV VAS SMA IMA PSV: 176 CM/S
BH CV VAS SMA ORIGIN EDV: 12 CM/S
BH CV VAS SMA ORIGIN PSV: 192 CM/S
BH CV VAS SMA SMA DIST PSV: 67.2 CM/S
BH CV VAS SMA SMA MID EDV: 15 CM/S
BH CV VAS SMA SMA MID PSV: 113 CM/S
BH CV VAS SMA SMA PROX EDV: 22.3 CM/S
BH CV VAS SMA SMA PROX PSV: 185 CM/S
BH CV VAS SMA SPLENIC EDV: 39 CM/S
BH CV VAS SMA SPLENIC PSV: 126 CM/S

## 2020-12-09 PROCEDURE — 0 DIATRIZOATE MEGLUMINE & SODIUM PER 1 ML: Performed by: INTERNAL MEDICINE

## 2020-12-09 PROCEDURE — 93975 VASCULAR STUDY: CPT

## 2020-12-09 PROCEDURE — 82565 ASSAY OF CREATININE: CPT

## 2020-12-09 PROCEDURE — 74177 CT ABD & PELVIS W/CONTRAST: CPT

## 2020-12-09 PROCEDURE — 25010000002 IOPAMIDOL 61 % SOLUTION: Performed by: INTERNAL MEDICINE

## 2020-12-09 RX ADMIN — DIATRIZOATE MEGLUMINE AND DIATRIZOATE SODIUM 30 ML: 660; 100 LIQUID ORAL; RECTAL at 11:00

## 2020-12-09 RX ADMIN — IOPAMIDOL 85 ML: 612 INJECTION, SOLUTION INTRAVENOUS at 12:03

## 2020-12-10 LAB — CREAT BLDA-MCNC: 1.1 MG/DL (ref 0.6–1.3)

## 2020-12-14 ENCOUNTER — HOSPITAL ENCOUNTER (OUTPATIENT)
Dept: CARDIOLOGY | Facility: HOSPITAL | Age: 60
Discharge: HOME | End: 2020-12-14

## 2020-12-14 DIAGNOSIS — R10.9 FUNCTIONAL ABDOMINAL PAIN SYNDROME: ICD-10-CM

## 2020-12-14 LAB
BH CV VAS SMA 1ST PP TIME: 15 MIN
BH CV VAS SMA 2ND PP TIME: 30 MIN
BH CV VAS SMA 3RD PP TIME: 45 MIN
BH CV VAS SMA SPLENIC EDV: 34 CM/S
BH CV VAS SMA SPLENIC PSV: 131 CM/S

## 2020-12-14 PROCEDURE — 93975 VASCULAR STUDY: CPT

## 2020-12-14 RX ORDER — ATORVASTATIN CALCIUM 80 MG/1
TABLET, FILM COATED ORAL
Qty: 90 TABLET | Refills: 3 | Status: SHIPPED | OUTPATIENT
Start: 2020-12-14 | End: 2021-12-09

## 2021-02-02 ENCOUNTER — APPOINTMENT (OUTPATIENT)
Dept: CT IMAGING | Facility: HOSPITAL | Age: 61
End: 2021-02-02

## 2021-02-16 ENCOUNTER — TELEHEALTH PROVIDED OTHER THAN IN PATIENT'S HOME (OUTPATIENT)
Dept: URBAN - METROPOLITAN AREA TELEHEALTH 5 | Facility: TELEHEALTH | Age: 61
End: 2021-02-16

## 2021-02-16 VITALS — HEIGHT: 71 IN

## 2021-02-16 DIAGNOSIS — K58.9 IRRITABLE BOWEL SYNDROME WITHOUT DIARRHEA: ICD-10-CM

## 2021-02-16 DIAGNOSIS — Z86.012 PERSONAL HISTORY OF BENIGN CARCINOID TUMOR: ICD-10-CM

## 2021-02-16 DIAGNOSIS — R10.13 EPIGASTRIC PAIN: ICD-10-CM

## 2021-02-16 DIAGNOSIS — Z86.010 PERSONAL HISTORY OF COLONIC POLYPS: ICD-10-CM

## 2021-02-16 DIAGNOSIS — K55.20 ANGIODYSPLASIA OF COLON WITHOUT HEMORRHAGE: ICD-10-CM

## 2021-02-16 DIAGNOSIS — R10.84 GENERALIZED ABDOMINAL PAIN: ICD-10-CM

## 2021-02-16 PROCEDURE — 99214 OFFICE O/P EST MOD 30 MIN: CPT | Mod: 95 | Performed by: INTERNAL MEDICINE

## 2021-03-02 ENCOUNTER — HOSPITAL ENCOUNTER (OUTPATIENT)
Dept: GENERAL RADIOLOGY | Facility: HOSPITAL | Age: 61
Discharge: HOME | End: 2021-03-02
Admitting: INTERNAL MEDICINE

## 2021-03-02 ENCOUNTER — TRANSCRIBE ORDERS (OUTPATIENT)
Dept: ADMINISTRATIVE | Facility: HOSPITAL | Age: 61
End: 2021-03-02

## 2021-03-02 DIAGNOSIS — K59.00 OBSTIPATION: ICD-10-CM

## 2021-03-02 DIAGNOSIS — K31.3 PYLOROSPASM: ICD-10-CM

## 2021-03-02 DIAGNOSIS — R10.84 GENERALIZED ABDOMINAL PAIN: Primary | ICD-10-CM

## 2021-03-02 DIAGNOSIS — R10.84 GENERALIZED ABDOMINAL PAIN: ICD-10-CM

## 2021-03-02 PROCEDURE — 74022 RADEX COMPL AQT ABD SERIES: CPT

## 2021-03-04 ENCOUNTER — TRANSCRIBE ORDERS (OUTPATIENT)
Dept: ADMINISTRATIVE | Facility: HOSPITAL | Age: 61
End: 2021-03-04

## 2021-03-04 DIAGNOSIS — K31.0 ACUTE DILATATION OF STOMACH: Primary | ICD-10-CM

## 2021-03-19 ENCOUNTER — BULK ORDERING (OUTPATIENT)
Dept: CASE MANAGEMENT | Facility: OTHER | Age: 61
End: 2021-03-19

## 2021-03-19 DIAGNOSIS — Z23 IMMUNIZATION DUE: ICD-10-CM

## 2021-06-29 ENCOUNTER — TELEPHONE (OUTPATIENT)
Dept: FAMILY MEDICINE CLINIC | Facility: CLINIC | Age: 61
End: 2021-06-29
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
## 2021-06-30 DIAGNOSIS — E78.5 HYPERLIPIDEMIA, UNSPECIFIED HYPERLIPIDEMIA TYPE: Primary | ICD-10-CM

## 2021-07-15 ENCOUNTER — OFFICE VISIT (OUTPATIENT)
Dept: FAMILY MEDICINE CLINIC | Facility: CLINIC | Age: 61
End: 2021-07-15

## 2021-07-15 VITALS
RESPIRATION RATE: 18 BRPM | HEIGHT: 71 IN | WEIGHT: 248.6 LBS | TEMPERATURE: 97.2 F | SYSTOLIC BLOOD PRESSURE: 140 MMHG | OXYGEN SATURATION: 97 % | HEART RATE: 71 BPM | BODY MASS INDEX: 34.8 KG/M2 | DIASTOLIC BLOOD PRESSURE: 70 MMHG

## 2021-07-15 DIAGNOSIS — E78.5 HYPERLIPIDEMIA, UNSPECIFIED HYPERLIPIDEMIA TYPE: Primary | ICD-10-CM

## 2021-07-15 DIAGNOSIS — R03.0 ELEVATED BLOOD PRESSURE READING WITHOUT DIAGNOSIS OF HYPERTENSION: ICD-10-CM

## 2021-07-15 DIAGNOSIS — R73.01 ELEVATED FASTING BLOOD SUGAR: ICD-10-CM

## 2021-07-15 DIAGNOSIS — E78.6 LOW HDL (UNDER 40): ICD-10-CM

## 2021-07-15 DIAGNOSIS — I25.10 CORONARY ARTERIOSCLEROSIS: ICD-10-CM

## 2021-07-15 PROBLEM — Z88.9 HX OF ADVERSE DRUG REACTION: Status: ACTIVE | Noted: 2018-03-20

## 2021-07-15 PROBLEM — K21.9 GERD (GASTROESOPHAGEAL REFLUX DISEASE): Status: ACTIVE | Noted: 2018-03-20

## 2021-07-15 PROBLEM — I20.9 ANGINA, CLASS III: Status: ACTIVE | Noted: 2020-02-13

## 2021-07-15 PROCEDURE — 99214 OFFICE O/P EST MOD 30 MIN: CPT | Performed by: INTERNAL MEDICINE
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
## 2021-09-21 ENCOUNTER — OFFICE (OUTPATIENT)
Dept: URBAN - METROPOLITAN AREA CLINIC 66 | Facility: CLINIC | Age: 61
End: 2021-09-21

## 2021-09-21 VITALS
HEIGHT: 71 IN | DIASTOLIC BLOOD PRESSURE: 55 MMHG | SYSTOLIC BLOOD PRESSURE: 112 MMHG | WEIGHT: 248 LBS | HEART RATE: 61 BPM

## 2021-09-21 DIAGNOSIS — R10.13 EPIGASTRIC PAIN: ICD-10-CM

## 2021-09-21 DIAGNOSIS — K21.00 GASTRO-ESOPHAGEAL REFLUX DISEASE WITH ESOPHAGITIS, WITHOUT B: ICD-10-CM

## 2021-09-21 PROCEDURE — 99214 OFFICE O/P EST MOD 30 MIN: CPT | Performed by: INTERNAL MEDICINE

## 2021-12-09 RX ORDER — ATORVASTATIN CALCIUM 80 MG/1
TABLET, FILM COATED ORAL
Qty: 90 TABLET | Refills: 0 | Status: SHIPPED | OUTPATIENT
Start: 2021-12-09 | End: 2022-03-17

## 2022-01-17 DIAGNOSIS — E78.5 HYPERLIPIDEMIA, UNSPECIFIED HYPERLIPIDEMIA TYPE: Primary | ICD-10-CM

## 2022-01-17 DIAGNOSIS — D64.9 ANEMIA, UNSPECIFIED TYPE: ICD-10-CM

## 2022-01-17 DIAGNOSIS — R73.01 ELEVATED FASTING BLOOD SUGAR: ICD-10-CM

## 2022-03-17 ENCOUNTER — HOSPITAL ENCOUNTER (OUTPATIENT)
Dept: GENERAL RADIOLOGY | Facility: HOSPITAL | Age: 62
Discharge: HOME | End: 2022-03-17

## 2022-03-17 ENCOUNTER — TRANSCRIBE ORDERS (OUTPATIENT)
Dept: ADMINISTRATIVE | Facility: HOSPITAL | Age: 62
End: 2022-03-17

## 2022-03-17 DIAGNOSIS — K58.9 IRRITABLE BOWEL SYNDROME WITHOUT DIARRHEA: Primary | ICD-10-CM

## 2022-03-17 DIAGNOSIS — K58.9 IRRITABLE BOWEL SYNDROME WITHOUT DIARRHEA: ICD-10-CM

## 2022-03-17 RX ORDER — ATORVASTATIN CALCIUM 80 MG/1
TABLET, FILM COATED ORAL
Qty: 90 TABLET | Refills: 0 | Status: SHIPPED | OUTPATIENT
Start: 2022-03-17 | End: 2022-06-15

## 2022-03-22 ENCOUNTER — OFFICE (OUTPATIENT)
Dept: URBAN - METROPOLITAN AREA CLINIC 66 | Facility: CLINIC | Age: 62
End: 2022-03-22

## 2022-03-22 VITALS — HEIGHT: 71 IN

## 2022-03-22 DIAGNOSIS — R13.10 DYSPHAGIA, UNSPECIFIED: ICD-10-CM

## 2022-03-22 DIAGNOSIS — R14.0 ABDOMINAL DISTENSION (GASEOUS): ICD-10-CM

## 2022-03-22 DIAGNOSIS — K59.00 CONSTIPATION, UNSPECIFIED: ICD-10-CM

## 2022-03-22 PROCEDURE — 99214 OFFICE O/P EST MOD 30 MIN: CPT | Performed by: INTERNAL MEDICINE

## 2022-05-19 ENCOUNTER — HOSPITAL ENCOUNTER (OUTPATIENT)
Dept: GENERAL RADIOLOGY | Facility: HOSPITAL | Age: 62
Discharge: HOME | End: 2022-05-19

## 2022-05-19 ENCOUNTER — PRE-ADMISSION TESTING (OUTPATIENT)
Dept: PREADMISSION TESTING | Facility: HOSPITAL | Age: 62
End: 2022-05-19

## 2022-05-19 VITALS
SYSTOLIC BLOOD PRESSURE: 130 MMHG | HEART RATE: 61 BPM | RESPIRATION RATE: 20 BRPM | TEMPERATURE: 97.6 F | WEIGHT: 253 LBS | BODY MASS INDEX: 35.42 KG/M2 | OXYGEN SATURATION: 98 % | DIASTOLIC BLOOD PRESSURE: 79 MMHG | HEIGHT: 71 IN

## 2022-05-19 LAB
ALBUMIN SERPL-MCNC: 4.1 G/DL (ref 3.5–5.2)
ALBUMIN/GLOB SERPL: 1.6 G/DL
ALP SERPL-CCNC: 59 U/L (ref 39–117)
ALT SERPL W P-5'-P-CCNC: 19 U/L (ref 1–41)
ANION GAP SERPL CALCULATED.3IONS-SCNC: 7.3 MMOL/L (ref 5–15)
AST SERPL-CCNC: 10 U/L (ref 1–40)
BILIRUB SERPL-MCNC: 0.6 MG/DL (ref 0–1.2)
BUN SERPL-MCNC: 17 MG/DL (ref 8–23)
BUN/CREAT SERPL: 16.5 (ref 7–25)
CALCIUM SPEC-SCNC: 9.7 MG/DL (ref 8.6–10.5)
CHLORIDE SERPL-SCNC: 106 MMOL/L (ref 98–107)
CO2 SERPL-SCNC: 25.7 MMOL/L (ref 22–29)
CREAT SERPL-MCNC: 1.03 MG/DL (ref 0.76–1.27)
DEPRECATED RDW RBC AUTO: 43 FL (ref 37–54)
EGFRCR SERPLBLD CKD-EPI 2021: 82.6 ML/MIN/1.73
ERYTHROCYTE [DISTWIDTH] IN BLOOD BY AUTOMATED COUNT: 13.1 % (ref 12.3–15.4)
GLOBULIN UR ELPH-MCNC: 2.5 GM/DL
GLUCOSE SERPL-MCNC: 129 MG/DL (ref 65–99)
HCT VFR BLD AUTO: 46.3 % (ref 37.5–51)
HGB BLD-MCNC: 15.5 G/DL (ref 13–17.7)
MCH RBC QN AUTO: 29.8 PG (ref 26.6–33)
MCHC RBC AUTO-ENTMCNC: 33.5 G/DL (ref 31.5–35.7)
MCV RBC AUTO: 88.9 FL (ref 79–97)
PLATELET # BLD AUTO: 175 10*3/MM3 (ref 140–450)
PMV BLD AUTO: 9.7 FL (ref 6–12)
POTASSIUM SERPL-SCNC: 4.1 MMOL/L (ref 3.5–5.2)
PROT SERPL-MCNC: 6.6 G/DL (ref 6–8.5)
RBC # BLD AUTO: 5.21 10*6/MM3 (ref 4.14–5.8)
SODIUM SERPL-SCNC: 139 MMOL/L (ref 136–145)
WBC NRBC COR # BLD: 7.89 10*3/MM3 (ref 3.4–10.8)

## 2022-05-19 PROCEDURE — 85027 COMPLETE CBC AUTOMATED: CPT

## 2022-05-19 PROCEDURE — 93005 ELECTROCARDIOGRAM TRACING: CPT

## 2022-05-19 PROCEDURE — 80053 COMPREHEN METABOLIC PANEL: CPT

## 2022-05-19 PROCEDURE — 93010 ELECTROCARDIOGRAM REPORT: CPT | Performed by: INTERNAL MEDICINE

## 2022-05-19 PROCEDURE — 71046 X-RAY EXAM CHEST 2 VIEWS: CPT

## 2022-05-19 PROCEDURE — 36415 COLL VENOUS BLD VENIPUNCTURE: CPT

## 2022-05-19 RX ORDER — NORTRIPTYLINE HYDROCHLORIDE 25 MG/1
50 CAPSULE ORAL NIGHTLY
COMMUNITY
Start: 2022-05-06

## 2022-05-19 NOTE — DISCHARGE INSTRUCTIONS
Take the following medications the morning of surgery:  ISOSORBIDE    If you are on prescription narcotic pain medication to control your pain you may also take that medication the morning of surgery.    General Instructions:  Do not eat solid food after midnight the night before surgery.  You may drink clear liquids day of surgery but must stop at least one hour before your hospital arrival time.  It is beneficial for you to have a clear drink that contains carbohydrates the day of surgery.  We suggest a 12 to 20 ounce bottle of Gatorade or Powerade for non-diabetic patients or a 12 to 20 ounce bottle of G2 or Powerade Zero for diabetic patients. (Pediatric patients, are not advised to drink a 12 to 20 ounce carbohydrate drink)    Clear liquids are liquids you can see through.  Nothing red in color.     Plain water                               Sports drinks  Sodas                                   Gelatin (Jell-O)  Fruit juices without pulp such as white grape juice and apple juice  Popsicles that contain no fruit or yogurt  Tea or coffee (no cream or milk added)  Gatorade / Powerade  G2 / Powerade Zero    Infants may have breast milk up to four hours before surgery.  Infants drinking formula may drink formula up to six hours before surgery.   Patients who avoid smoking, chewing tobacco and alcohol for 4 weeks prior to surgery have a reduced risk of post-operative complications.  Quit smoking as many days before surgery as you can.  Do not smoke, use chewing tobacco or drink alcohol the day of surgery.   If applicable bring your C-PAP/ BI-PAP machine.  Bring any papers given to you in the doctor’s office.  Wear clean comfortable clothes.  Do not wear contact lenses, false eyelashes or make-up.  Bring a case for your glasses.   Bring crutches or walker if applicable.  Remove all piercings.  Leave jewelry and any other valuables at home.  Hair extensions with metal clips must be removed prior to surgery.  The  Pre-Admission Testing nurse will instruct you to bring medications if unable to obtain an accurate list in Pre-Admission Testing.        If you were given a blood bank ID arm band remember to bring it with you the day of surgery.    Preventing a Surgical Site Infection:  For 2 to 3 days before surgery, avoid shaving with a razor because the razor can irritate skin and make it easier to develop an infection.    Any areas of open skin can increase the risk of a post-operative wound infection by allowing bacteria to enter and travel throughout the body.  Notify your surgeon if you have any skin wounds / rashes even if it is not near the expected surgical site.  The area will need assessed to determine if surgery should be delayed until it is healed.  The night prior to surgery shower using a fresh bar of anti-bacterial soap (such as Dial) and clean washcloth.  Sleep in a clean bed with clean clothing.  Do not allow pets to sleep with you.  Shower on the morning of surgery using a fresh bar of anti-bacterial soap (such as Dial) and clean washcloth.  Dry with a clean towel and dress in clean clothing.  Ask your surgeon if you will be receiving antibiotics prior to surgery.  Make sure you, your family, and all healthcare providers clean their hands with soap and water or an alcohol based hand  before caring for you or your wound.    Day of surgery: 6/2/2022 PT WILL BE NOTIFIED OF ARRIVAL TIME   Your arrival time is approximately two hours before your scheduled surgery time.  Upon arrival, a Pre-op nurse and Anesthesiologist will review your health history, obtain vital signs, and answer questions you may have.  The only belongings needed at this time will be a list of your home medications and if applicable your C-PAP/BI-PAP machine.  A Pre-op nurse will start an IV and you may receive medication in preparation for surgery, including something to help you relax.     Please be aware that surgery does come with  discomfort.  We want to make every effort to control your discomfort so please discuss any uncontrolled symptoms with your nurse.   Your doctor will most likely have prescribed pain medications.      If you are going home after surgery you will receive individualized written care instructions before being discharged.  A responsible adult must drive you to and from the hospital on the day of your surgery and stay with you for 24 hours.  Discharge prescriptions can be filled by the hospital pharmacy during regular pharmacy hours.  If you are having surgery late in the day/evening your prescription may be e-prescribed to your pharmacy.  Please verify your pharmacy hours or chose a 24 hour pharmacy to avoid not having access to your prescription because your pharmacy has closed for the day.    If you are staying overnight following surgery, you will be transported to your hospital room following the recovery period.  [facility] has all private rooms.    If you have any questions please call Pre-Admission Testing at [phone].  Deductibles and co-payments are collected on the day of service. Please be prepared to pay the required co-pay, deductible or deposit on the day of service as defined by your plan.    Patient Education for Self-Quarantine Process    Following your COVID testing, we strongly recommend that you wear a mask when you are with other people and practice social distancing.   Limit your activities to only required outings.  Wash your hands with soap and water frequently for at least 20 seconds.   Avoid touching your eyes, nose and mouth with unwashed hands.  Do not share anything - utensils, drinking glasses, food from the same bowl.   Sanitize household surfaces daily. Include all high touch areas (door handles, light switches, phones, countertops, etc.)    Call your surgeon immediately if you experience any of the following symptoms:  Sore Throat  Shortness of Breath or difficulty  breathing  Cough  Chills  Body soreness or muscle pain  Headache  Fever  New loss of taste or smell  Do not arrive for your surgery ill.  Your procedure will need to be rescheduled to another time.  You will need to call your physician before the day of surgery to avoid any unnecessary exposure to hospital staff as well as other patients.

## 2022-05-20 LAB — QT INTERVAL: 421 MS

## 2022-06-09 ENCOUNTER — TELEPHONE (OUTPATIENT)
Dept: FAMILY MEDICINE CLINIC | Facility: CLINIC | Age: 62
End: 2022-06-09

## 2022-06-09 NOTE — TELEPHONE ENCOUNTER
Caller: Epley, Kevin M    Relationship to patient: Self    Best call back number: [phone]    Patient is needing: PATIENT WANTS TO DISCUSS THE RESULTS OF RECENT CT SCAN LAST Friday. PATIENT IS REQUESTING A CALL TO DISCUSS.

## 2022-06-10 LAB
BUN SERPL-MCNC: 15 MG/DL (ref 8–27)
BUN/CREAT SERPL: 14 (ref 10–24)
CALCIUM SERPL-MCNC: 9.3 MG/DL (ref 8.6–10.2)
CHLORIDE SERPL-SCNC: 105 MMOL/L (ref 96–106)
CHOLEST SERPL-MCNC: 109 MG/DL (ref 100–199)
CO2 SERPL-SCNC: 23 MMOL/L (ref 20–29)
CREAT SERPL-MCNC: 1.05 MG/DL (ref 0.76–1.27)
EGFRCR SERPLBLD CKD-EPI 2021: 81 ML/MIN/1.73
GLUCOSE SERPL-MCNC: 130 MG/DL (ref 65–99)
HBA1C MFR BLD: 6.1 % (ref 4.8–5.6)
HDLC SERPL-MCNC: 38 MG/DL
LDLC SERPL CALC-MCNC: 52 MG/DL (ref 0–99)
LDLC/HDLC SERPL: 1.4 RATIO (ref 0–3.6)
POTASSIUM SERPL-SCNC: 4.3 MMOL/L (ref 3.5–5.2)
SODIUM SERPL-SCNC: 142 MMOL/L (ref 134–144)
TRIGL SERPL-MCNC: 103 MG/DL (ref 0–149)
VLDLC SERPL CALC-MCNC: 19 MG/DL (ref 5–40)

## 2022-06-13 ENCOUNTER — OFFICE VISIT (OUTPATIENT)
Dept: FAMILY MEDICINE CLINIC | Facility: CLINIC | Age: 62
End: 2022-06-13

## 2022-06-13 VITALS
SYSTOLIC BLOOD PRESSURE: 146 MMHG | TEMPERATURE: 96.4 F | BODY MASS INDEX: 35.53 KG/M2 | OXYGEN SATURATION: 96 % | HEIGHT: 71 IN | DIASTOLIC BLOOD PRESSURE: 87 MMHG | HEART RATE: 67 BPM | WEIGHT: 253.8 LBS | RESPIRATION RATE: 18 BRPM

## 2022-06-13 DIAGNOSIS — E07.9 THYROID MASS: ICD-10-CM

## 2022-06-13 DIAGNOSIS — E78.6 LOW HDL (UNDER 40): ICD-10-CM

## 2022-06-13 DIAGNOSIS — R03.0 ELEVATED BLOOD PRESSURE READING WITHOUT DIAGNOSIS OF HYPERTENSION: Primary | ICD-10-CM

## 2022-06-13 DIAGNOSIS — I25.10 CORONARY ARTERIOSCLEROSIS: ICD-10-CM

## 2022-06-13 DIAGNOSIS — E78.5 HYPERLIPIDEMIA, UNSPECIFIED HYPERLIPIDEMIA TYPE: ICD-10-CM

## 2022-06-13 DIAGNOSIS — R73.03 PREDIABETES: ICD-10-CM

## 2022-06-13 PROCEDURE — 99214 OFFICE O/P EST MOD 30 MIN: CPT | Performed by: INTERNAL MEDICINE
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
## 2022-06-14 DIAGNOSIS — E07.9 THYROID MASS: Primary | ICD-10-CM

## 2022-06-14 LAB
Lab: NORMAL
PSA SERPL-MCNC: 2.3 NG/ML (ref 0–4)
WRITTEN AUTHORIZATION: NORMAL

## 2022-06-14 NOTE — PROGRESS NOTES
06/24/22 0001   Pre-Procedure Phone Call   Procedure Time Verified Yes   Arrival Time 0700   Procedure Location Verified Yes   Medical History Reviewed No   NPO Status Reinforced Yes   Ride and Caregiver Arranged N/A   Patient Knows to Bring Current Medications   (No changes in medications since last reviewed)   Bring Outside Films Requested   (US Thyroid outside films 6/14/22 in PACS)

## 2022-06-15 RX ORDER — ATORVASTATIN CALCIUM 80 MG/1
TABLET, FILM COATED ORAL
Qty: 90 TABLET | Refills: 0 | Status: SHIPPED | OUTPATIENT
Start: 2022-06-15 | End: 2022-09-23

## 2022-06-24 ENCOUNTER — HOSPITAL ENCOUNTER (OUTPATIENT)
Dept: ULTRASOUND IMAGING | Facility: HOSPITAL | Age: 62
Discharge: HOME | End: 2022-06-24
Admitting: INTERNAL MEDICINE

## 2022-06-24 VITALS
TEMPERATURE: 98.4 F | HEIGHT: 71 IN | SYSTOLIC BLOOD PRESSURE: 124 MMHG | HEART RATE: 56 BPM | WEIGHT: 245 LBS | BODY MASS INDEX: 34.3 KG/M2 | RESPIRATION RATE: 18 BRPM | DIASTOLIC BLOOD PRESSURE: 74 MMHG | OXYGEN SATURATION: 95 %

## 2022-06-24 DIAGNOSIS — E07.9 THYROID MASS: ICD-10-CM

## 2022-06-24 PROCEDURE — 88305 TISSUE EXAM BY PATHOLOGIST: CPT | Performed by: INTERNAL MEDICINE

## 2022-06-24 PROCEDURE — 0 LIDOCAINE 1 % SOLUTION: Performed by: RADIOLOGY

## 2022-06-24 PROCEDURE — 88173 CYTOPATH EVAL FNA REPORT: CPT | Performed by: INTERNAL MEDICINE

## 2022-06-24 RX ORDER — DILTIAZEM HYDROCHLORIDE 120 MG/1
120 TABLET, FILM COATED ORAL DAILY
COMMUNITY

## 2022-06-24 RX ORDER — LIDOCAINE HYDROCHLORIDE 10 MG/ML
10 INJECTION, SOLUTION INFILTRATION; PERINEURAL ONCE
Status: COMPLETED | OUTPATIENT
Start: 2022-06-24 | End: 2022-06-24

## 2022-06-24 RX ADMIN — LIDOCAINE HYDROCHLORIDE 1 ML: 10 INJECTION, SOLUTION INFILTRATION; PERINEURAL at 08:51

## 2022-06-24 NOTE — NURSING NOTE
Patient arrived to radiology triage for Thyroid biopsy/ FNA.    Protective goggles and mask in place with all patient interactions today

## 2022-06-24 NOTE — DISCHARGE INSTRUCTIONS
EDUCATION / DISCHARGE INSTRUCTIONS  Aspiration:  An aspiration is a procedure used to take a sample of cells or tissue from a lump, mass or other area of concern.   Within a week the radiologist will send a report to your physician.  A pathologist will also examine the tissue and send a report.  THIS EDUCATION INFORMATION WAS REVIEWED PRIOR TO THE PROCEDURE AND CONSENT.  Patient initials_________________Time________________      Post Procedure:    *  Rest today (no pushing pulling or straining).   *  Slowly increase activity tomorow.    *  If you received sedation do not drive for 24 hours.   *  Keep dressing clean and dry.   *  Leave dressing on puncture site for 24 hours.    *  You may shower when dressing removed.   *  If needed, use an ice pack at the site for up to 20 minutes at a time for pain.    Call your doctor if experiencing:   *  Signs of infection such as redness, swelling, excessive pain and / or foul      smelling drainage from the puncture site.   *  Chills or fever over 101 degrees (by mouth).   *  Unrelieved pain.   *  Any new or severe symptoms.      Following the procedure:     Follow-up with the ordering physician as directed.    Continue to take other medications as directed by your physician unless  otherwise instructed.   If applicable, resume taking your blood thinners or Aspirin on _06/25/2022    EDUCATION /DISCHARGE INSTRUCTIONS  CT/US guided biopsy:  A biopsy is a procedure done to remove tissue for further analysis.  Before images are taken to locate the target area.  Images can be obtained using ultrasound, CT or MRI.  A physician will clean your skin with antiseptic soap, place a sterile towel around the site and administer a local anesthetic to numb the area.  The physician will then insert a special needle.  Sometimes images are taken of the needle after it is inserted to ensure the needle is in the correct area to be biopsied.   A sample is obtained and sent to the laboratory for  study.  Occasionally the laboratory is unable to make a diagnosis from the sample and the procedure may need to be repeated.  Within a week the radiologist will send a report to your physician.  A pathologist will also examine the tissue and send a report.    Risks of the procedure include but are not limited to:   *  Bleeding    *  Infection   *  Puncture of surrounding organs *  Death     *  Lung collapse if the biopsy is near the chest which may require insertion of a      chest tube to re-inflate the lung if severe.    Benefits of the procedure:  Using x-ray helps to locate the area that requires a biopsy. The procedure is less invasive than a surgical procedure, there are no large incisions and it does not require anesthesia.    Alternatives to the procedure:  A biopsy can be performed surgically.  Risks of a surgical biopsy include exposure to anesthesia, infection, excessive bleeding and injury to abdominal organs.  A benefit of surgical biopsy is the ability to see the area to be biopsied and remove of a larger piece of tissue.    THIS EDUCATION INFORMATION WAS REVIEWED PRIOR TO PROCEDURE AND CONSENT. Patient initials__________________Time___________________    Post Procedure:    *  Expect the biopsy site may be tender up to one week.    *  Rest today (no pushing pulling or straining).   *  Slowly increase activity tomorrow.    *  If you received sedation do not drive for 24 hours.   *  Keep dressing clean and dry.   *  Leave dressing on puncture site for 24 hours.    *  You may shower when dressing removed.  Call your doctor if experiencing:   *  Signs of infection such as redness, swelling, excessive pain and / or foul        smelling drainage from the puncture site.   *  Chills or fever over 101 degrees (by mouth).   *  Unrelieved pain.   *  Any new or severe symptoms.   *  If experiencing sudden / severe shortness of breath or chest pain go to the       nearest emergency room.   Following the procedure:      Follow-up with the ordering physician as directed.    Continue to take other medications as directed by your physician unless    otherwise instructed.   If applicable, resume taking your blood thinners or Aspirin on _06/25/2022______.    If you have any concerns please call the Radiology Nurses Desk at [phone].  You are the most important factor in your recovery.  Follow the above instructions carefully. __________.     If you have any concerns please call the Radiology Nurses Desk at [phone].  You are the most important factor in your recovery.  Follow the above instructions carefully.

## 2022-06-26 ENCOUNTER — TELEPHONE (OUTPATIENT)
Dept: INTERVENTIONAL RADIOLOGY/VASCULAR | Facility: HOSPITAL | Age: 62
End: 2022-06-26

## 2022-07-13 LAB
CYTO UR: NORMAL
LAB AP CASE REPORT: NORMAL
LAB AP DIAGNOSIS COMMENT: NORMAL
LAB AP FLOW CYTOMETRY REPORT,ADDENDUM: NORMAL
LAB AP NON-GYN SPECIMEN ADEQUACY: NORMAL
PATH REPORT.FINAL DX SPEC: NORMAL
PATH REPORT.GROSS SPEC: NORMAL

## 2022-09-23 RX ORDER — ATORVASTATIN CALCIUM 80 MG/1
TABLET, FILM COATED ORAL
Qty: 90 TABLET | Refills: 0 | Status: SHIPPED | OUTPATIENT
Start: 2022-09-23

## 2022-12-14 LAB
ALBUMIN SERPL-MCNC: 4.6 G/DL (ref 3.8–4.8)
ALBUMIN/GLOB SERPL: 2.2 {RATIO} (ref 1.2–2.2)
ALP SERPL-CCNC: 71 IU/L (ref 44–121)
ALT SERPL-CCNC: 20 IU/L (ref 0–44)
AST SERPL-CCNC: 13 IU/L (ref 0–40)
BILIRUB SERPL-MCNC: 0.5 MG/DL (ref 0–1.2)
BUN SERPL-MCNC: 15 MG/DL (ref 8–27)
BUN/CREAT SERPL: 15 (ref 10–24)
CALCIUM SERPL-MCNC: 9.3 MG/DL (ref 8.6–10.2)
CHLORIDE SERPL-SCNC: 99 MMOL/L (ref 96–106)
CHOLEST SERPL-MCNC: 124 MG/DL (ref 100–199)
CO2 SERPL-SCNC: 25 MMOL/L (ref 20–29)
CREAT SERPL-MCNC: 1.02 MG/DL (ref 0.76–1.27)
EGFRCR SERPLBLD CKD-EPI 2021: 83 ML/MIN/1.73
GLOBULIN SER CALC-MCNC: 2.1 G/DL (ref 1.5–4.5)
GLUCOSE SERPL-MCNC: 151 MG/DL (ref 70–99)
HBA1C MFR BLD: 6.7 % (ref 4.8–5.6)
HDLC SERPL-MCNC: 48 MG/DL
LDLC SERPL CALC-MCNC: 56 MG/DL (ref 0–99)
LDLC/HDLC SERPL: 1.2 RATIO (ref 0–3.6)
POTASSIUM SERPL-SCNC: 4.4 MMOL/L (ref 3.5–5.2)
PROT SERPL-MCNC: 6.7 G/DL (ref 6–8.5)
SODIUM SERPL-SCNC: 140 MMOL/L (ref 134–144)
TRIGL SERPL-MCNC: 113 MG/DL (ref 0–149)
TSH SERPL DL<=0.005 MIU/L-ACNC: 3.26 UIU/ML (ref 0.45–4.5)
VLDLC SERPL CALC-MCNC: 20 MG/DL (ref 5–40)

## 2022-12-20 ENCOUNTER — OFFICE (OUTPATIENT)
Dept: URBAN - METROPOLITAN AREA CLINIC 66 | Facility: CLINIC | Age: 62
End: 2022-12-20
Payer: COMMERCIAL

## 2022-12-20 VITALS
WEIGHT: 255 LBS | SYSTOLIC BLOOD PRESSURE: 118 MMHG | HEIGHT: 71 IN | HEART RATE: 67 BPM | DIASTOLIC BLOOD PRESSURE: 71 MMHG

## 2022-12-20 DIAGNOSIS — Z86.012 PERSONAL HISTORY OF BENIGN CARCINOID TUMOR: ICD-10-CM

## 2022-12-20 DIAGNOSIS — K55.20 ANGIODYSPLASIA OF COLON WITHOUT HEMORRHAGE: ICD-10-CM

## 2022-12-20 DIAGNOSIS — K58.9 IRRITABLE BOWEL SYNDROME WITHOUT DIARRHEA: ICD-10-CM

## 2022-12-20 DIAGNOSIS — R10.84 GENERALIZED ABDOMINAL PAIN: ICD-10-CM

## 2022-12-20 DIAGNOSIS — Z86.010 PERSONAL HISTORY OF COLONIC POLYPS: ICD-10-CM

## 2022-12-20 PROCEDURE — 99213 OFFICE O/P EST LOW 20 MIN: CPT | Performed by: INTERNAL MEDICINE

## 2022-12-20 RX ORDER — HYOSCYAMINE SULFATE 0.12 MG/1
TABLET SUBLINGUAL
Qty: 60 | Refills: 6 | Status: ACTIVE

## 2023-06-19 DIAGNOSIS — R73.03 PREDIABETES: ICD-10-CM

## 2023-06-19 DIAGNOSIS — E78.6 LOW HDL (UNDER 40): ICD-10-CM

## 2023-06-19 DIAGNOSIS — E78.5 HYPERLIPIDEMIA, UNSPECIFIED HYPERLIPIDEMIA TYPE: Primary | ICD-10-CM

## 2023-06-20 LAB
ALBUMIN SERPL-MCNC: 4.4 G/DL (ref 3.5–5.2)
ALBUMIN/GLOB SERPL: 2.4 G/DL
ALP SERPL-CCNC: 68 U/L (ref 39–117)
ALT SERPL-CCNC: 18 U/L (ref 1–41)
APPEARANCE UR: CLEAR
AST SERPL-CCNC: 15 U/L (ref 1–40)
BASOPHILS # BLD AUTO: 0.02 10*3/MM3 (ref 0–0.2)
BASOPHILS NFR BLD AUTO: 0.3 % (ref 0–1.5)
BILIRUB SERPL-MCNC: 0.5 MG/DL (ref 0–1.2)
BILIRUB UR QL STRIP: NEGATIVE
BUN SERPL-MCNC: 18 MG/DL (ref 8–23)
BUN/CREAT SERPL: 16.7 (ref 7–25)
CALCIUM SERPL-MCNC: 9 MG/DL (ref 8.6–10.5)
CHLORIDE SERPL-SCNC: 105 MMOL/L (ref 98–107)
CHOLEST SERPL-MCNC: 102 MG/DL (ref 0–200)
CO2 SERPL-SCNC: 27.7 MMOL/L (ref 22–29)
COLOR UR: YELLOW
CREAT SERPL-MCNC: 1.08 MG/DL (ref 0.76–1.27)
EGFRCR SERPLBLD CKD-EPI 2021: 77.6 ML/MIN/1.73
EOSINOPHIL # BLD AUTO: 0.14 10*3/MM3 (ref 0–0.4)
EOSINOPHIL NFR BLD AUTO: 1.9 % (ref 0.3–6.2)
ERYTHROCYTE [DISTWIDTH] IN BLOOD BY AUTOMATED COUNT: 13 % (ref 12.3–15.4)
GLOBULIN SER CALC-MCNC: 1.8 GM/DL
GLUCOSE SERPL-MCNC: 117 MG/DL (ref 65–99)
GLUCOSE UR QL STRIP: NEGATIVE
HBA1C MFR BLD: 6.3 % (ref 4.8–5.6)
HCT VFR BLD AUTO: 43.7 % (ref 37.5–51)
HDLC SERPL-MCNC: 38 MG/DL (ref 40–60)
HGB BLD-MCNC: 14.5 G/DL (ref 13–17.7)
HGB UR QL STRIP: NEGATIVE
IMM GRANULOCYTES # BLD AUTO: 0.02 10*3/MM3 (ref 0–0.05)
IMM GRANULOCYTES NFR BLD AUTO: 0.3 % (ref 0–0.5)
KETONES UR QL STRIP: NEGATIVE
LDLC SERPL CALC-MCNC: 48 MG/DL (ref 0–100)
LDLC/HDLC SERPL: 1.27 {RATIO}
LEUKOCYTE ESTERASE UR QL STRIP: NEGATIVE
LYMPHOCYTES # BLD AUTO: 1.58 10*3/MM3 (ref 0.7–3.1)
LYMPHOCYTES NFR BLD AUTO: 21.9 % (ref 19.6–45.3)
MCH RBC QN AUTO: 28.8 PG (ref 26.6–33)
MCHC RBC AUTO-ENTMCNC: 33.2 G/DL (ref 31.5–35.7)
MCV RBC AUTO: 86.7 FL (ref 79–97)
MONOCYTES # BLD AUTO: 0.74 10*3/MM3 (ref 0.1–0.9)
MONOCYTES NFR BLD AUTO: 10.3 % (ref 5–12)
NEUTROPHILS # BLD AUTO: 4.71 10*3/MM3 (ref 1.7–7)
NEUTROPHILS NFR BLD AUTO: 65.3 % (ref 42.7–76)
NITRITE UR QL STRIP: NEGATIVE
NRBC BLD AUTO-RTO: 0 /100 WBC (ref 0–0.2)
PH UR STRIP: 7 [PH] (ref 5–8)
PLATELET # BLD AUTO: 190 10*3/MM3 (ref 140–450)
POTASSIUM SERPL-SCNC: 4.8 MMOL/L (ref 3.5–5.2)
PROT SERPL-MCNC: 6.2 G/DL (ref 6–8.5)
PROT UR QL STRIP: NEGATIVE
RBC # BLD AUTO: 5.04 10*6/MM3 (ref 4.14–5.8)
SODIUM SERPL-SCNC: 142 MMOL/L (ref 136–145)
SP GR UR STRIP: <1.005 (ref 1–1.03)
TRIGL SERPL-MCNC: 79 MG/DL (ref 0–150)
UROBILINOGEN UR STRIP-MCNC: ABNORMAL MG/DL
VLDLC SERPL CALC-MCNC: 16 MG/DL (ref 5–40)
WBC # BLD AUTO: 7.21 10*3/MM3 (ref 3.4–10.8)

## 2023-06-23 PROBLEM — E11.9 TYPE 2 DIABETES MELLITUS, WITHOUT LONG-TERM CURRENT USE OF INSULIN: Status: ACTIVE | Noted: 2023-06-23

## 2023-07-11 ENCOUNTER — OFFICE (OUTPATIENT)
Dept: URBAN - METROPOLITAN AREA CLINIC 66 | Facility: CLINIC | Age: 63
End: 2023-07-11

## 2023-07-11 VITALS
SYSTOLIC BLOOD PRESSURE: 108 MMHG | DIASTOLIC BLOOD PRESSURE: 62 MMHG | HEART RATE: 51 BPM | HEIGHT: 71 IN | WEIGHT: 241 LBS

## 2023-07-11 DIAGNOSIS — K58.1 IRRITABLE BOWEL SYNDROME WITH CONSTIPATION: ICD-10-CM

## 2023-07-11 DIAGNOSIS — R10.84 GENERALIZED ABDOMINAL PAIN: ICD-10-CM

## 2023-07-11 DIAGNOSIS — Z86.010 PERSONAL HISTORY OF COLONIC POLYPS: ICD-10-CM

## 2023-07-11 PROCEDURE — 99213 OFFICE O/P EST LOW 20 MIN: CPT | Performed by: INTERNAL MEDICINE

## 2023-09-25 DIAGNOSIS — E11.9 TYPE 2 DIABETES MELLITUS WITHOUT COMPLICATION, WITHOUT LONG-TERM CURRENT USE OF INSULIN: ICD-10-CM

## 2023-09-25 DIAGNOSIS — E78.5 HYPERLIPIDEMIA, UNSPECIFIED HYPERLIPIDEMIA TYPE: Primary | ICD-10-CM

## 2023-09-25 DIAGNOSIS — E78.6 LOW HDL (UNDER 40): ICD-10-CM

## 2023-09-25 DIAGNOSIS — D64.9 ANEMIA, UNSPECIFIED TYPE: ICD-10-CM

## 2023-09-26 LAB
ALBUMIN SERPL-MCNC: 4.4 G/DL (ref 3.9–4.9)
ALBUMIN/CREAT UR: <15 MG/G CREAT (ref 0–29)
ALBUMIN/GLOB SERPL: 2 {RATIO} (ref 1.2–2.2)
ALP SERPL-CCNC: 63 IU/L (ref 44–121)
ALT SERPL-CCNC: 25 IU/L (ref 0–44)
AST SERPL-CCNC: 17 IU/L (ref 0–40)
BASOPHILS # BLD AUTO: 0 X10E3/UL (ref 0–0.2)
BASOPHILS NFR BLD AUTO: 1 %
BILIRUB SERPL-MCNC: 0.6 MG/DL (ref 0–1.2)
BUN SERPL-MCNC: 17 MG/DL (ref 8–27)
BUN/CREAT SERPL: 16 (ref 10–24)
CALCIUM SERPL-MCNC: 9.2 MG/DL (ref 8.6–10.2)
CHLORIDE SERPL-SCNC: 103 MMOL/L (ref 96–106)
CHOLEST SERPL-MCNC: 117 MG/DL (ref 100–199)
CO2 SERPL-SCNC: 26 MMOL/L (ref 20–29)
CREAT SERPL-MCNC: 1.04 MG/DL (ref 0.76–1.27)
CREAT UR-MCNC: 19.5 MG/DL
EGFRCR SERPLBLD CKD-EPI 2021: 81 ML/MIN/1.73
EOSINOPHIL # BLD AUTO: 0.2 X10E3/UL (ref 0–0.4)
EOSINOPHIL NFR BLD AUTO: 2 %
ERYTHROCYTE [DISTWIDTH] IN BLOOD BY AUTOMATED COUNT: 12.9 % (ref 11.6–15.4)
GLOBULIN SER CALC-MCNC: 2.2 G/DL (ref 1.5–4.5)
GLUCOSE SERPL-MCNC: 127 MG/DL (ref 70–99)
HBA1C MFR BLD: 6.1 % (ref 4.8–5.6)
HCT VFR BLD AUTO: 46 % (ref 37.5–51)
HDLC SERPL-MCNC: 43 MG/DL
HGB BLD-MCNC: 15.4 G/DL (ref 13–17.7)
IMM GRANULOCYTES # BLD AUTO: 0 X10E3/UL (ref 0–0.1)
IMM GRANULOCYTES NFR BLD AUTO: 0 %
LDLC SERPL CALC-MCNC: 57 MG/DL (ref 0–99)
LDLC/HDLC SERPL: 1.3 RATIO (ref 0–3.6)
LYMPHOCYTES # BLD AUTO: 2.2 X10E3/UL (ref 0.7–3.1)
LYMPHOCYTES NFR BLD AUTO: 28 %
MCH RBC QN AUTO: 29.1 PG (ref 26.6–33)
MCHC RBC AUTO-ENTMCNC: 33.5 G/DL (ref 31.5–35.7)
MCV RBC AUTO: 87 FL (ref 79–97)
MICROALBUMIN UR-MCNC: <3 UG/ML
MONOCYTES # BLD AUTO: 0.6 X10E3/UL (ref 0.1–0.9)
MONOCYTES NFR BLD AUTO: 8 %
NEUTROPHILS # BLD AUTO: 4.8 X10E3/UL (ref 1.4–7)
NEUTROPHILS NFR BLD AUTO: 61 %
PLATELET # BLD AUTO: 188 X10E3/UL (ref 150–450)
POTASSIUM SERPL-SCNC: 4.9 MMOL/L (ref 3.5–5.2)
PROT SERPL-MCNC: 6.6 G/DL (ref 6–8.5)
RBC # BLD AUTO: 5.29 X10E6/UL (ref 4.14–5.8)
SODIUM SERPL-SCNC: 140 MMOL/L (ref 134–144)
TRIGL SERPL-MCNC: 84 MG/DL (ref 0–149)
VLDLC SERPL CALC-MCNC: 17 MG/DL (ref 5–40)
WBC # BLD AUTO: 7.8 X10E3/UL (ref 3.4–10.8)

## 2023-09-28 LAB
T4 FREE SERPL-MCNC: 0.93 NG/DL (ref 0.82–1.77)
TSH SERPL DL<=0.005 MIU/L-ACNC: 4.72 UIU/ML (ref 0.45–4.5)
WRITTEN AUTHORIZATION: NORMAL

## 2023-09-29 ENCOUNTER — OFFICE VISIT (OUTPATIENT)
Dept: FAMILY MEDICINE CLINIC | Facility: CLINIC | Age: 63
End: 2023-09-29
Payer: COMMERCIAL

## 2023-09-29 VITALS
DIASTOLIC BLOOD PRESSURE: 60 MMHG | OXYGEN SATURATION: 95 % | HEART RATE: 54 BPM | HEIGHT: 71 IN | BODY MASS INDEX: 33.74 KG/M2 | SYSTOLIC BLOOD PRESSURE: 102 MMHG | WEIGHT: 241 LBS

## 2023-09-29 DIAGNOSIS — K21.9 GASTROESOPHAGEAL REFLUX DISEASE, UNSPECIFIED WHETHER ESOPHAGITIS PRESENT: ICD-10-CM

## 2023-09-29 DIAGNOSIS — R73.03 PREDIABETES: ICD-10-CM

## 2023-09-29 DIAGNOSIS — E78.5 HYPERLIPIDEMIA, UNSPECIFIED HYPERLIPIDEMIA TYPE: Primary | ICD-10-CM

## 2023-09-29 DIAGNOSIS — Z23 NEED FOR PNEUMOCOCCAL VACCINATION: ICD-10-CM

## 2023-09-29 DIAGNOSIS — E78.5 DYSLIPIDEMIA: ICD-10-CM

## 2023-09-29 DIAGNOSIS — Z23 NEED FOR IMMUNIZATION AGAINST INFLUENZA: ICD-10-CM

## 2023-09-29 RX ORDER — EZETIMIBE 10 MG/1
10 TABLET ORAL DAILY
Qty: 90 TABLET | Refills: 3 | Status: SHIPPED | OUTPATIENT
Start: 2023-09-29

## 2023-09-29 RX ORDER — LORAZEPAM 0.5 MG/1
0.5 TABLET ORAL EVERY 8 HOURS PRN
Qty: 30 TABLET | Refills: 2 | Status: SHIPPED | OUTPATIENT
Start: 2023-09-29
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
## 2023-10-03 ENCOUNTER — TELEPHONE (OUTPATIENT)
Dept: FAMILY MEDICINE CLINIC | Facility: CLINIC | Age: 63
End: 2023-10-03
Payer: COMMERCIAL

## 2023-12-12 DIAGNOSIS — E78.5 HYPERLIPIDEMIA, UNSPECIFIED HYPERLIPIDEMIA TYPE: Primary | ICD-10-CM

## 2023-12-12 DIAGNOSIS — E78.6 LOW HDL (UNDER 40): ICD-10-CM

## 2023-12-12 DIAGNOSIS — Z12.5 SCREENING PSA (PROSTATE SPECIFIC ANTIGEN): ICD-10-CM

## 2023-12-13 LAB
ALBUMIN SERPL-MCNC: 4.6 G/DL (ref 3.5–5.2)
ALBUMIN/GLOB SERPL: 2.6 G/DL
ALP SERPL-CCNC: 61 U/L (ref 39–117)
ALT SERPL-CCNC: 47 U/L (ref 1–41)
AST SERPL-CCNC: 28 U/L (ref 1–40)
BILIRUB SERPL-MCNC: 0.8 MG/DL (ref 0–1.2)
BUN SERPL-MCNC: 14 MG/DL (ref 8–23)
BUN/CREAT SERPL: 14.4 (ref 7–25)
CALCIUM SERPL-MCNC: 9.1 MG/DL (ref 8.6–10.5)
CHLORIDE SERPL-SCNC: 105 MMOL/L (ref 98–107)
CHOLEST SERPL-MCNC: 94 MG/DL (ref 0–200)
CO2 SERPL-SCNC: 27.4 MMOL/L (ref 22–29)
CREAT SERPL-MCNC: 0.97 MG/DL (ref 0.76–1.27)
EGFRCR SERPLBLD CKD-EPI 2021: 87.7 ML/MIN/1.73
GLOBULIN SER CALC-MCNC: 1.8 GM/DL
GLUCOSE SERPL-MCNC: 143 MG/DL (ref 65–99)
HDLC SERPL-MCNC: 41 MG/DL (ref 40–60)
LDLC SERPL CALC-MCNC: 36 MG/DL (ref 0–100)
LDLC/HDLC SERPL: 0.88 {RATIO}
POTASSIUM SERPL-SCNC: 4.5 MMOL/L (ref 3.5–5.2)
PROT SERPL-MCNC: 6.4 G/DL (ref 6–8.5)
PSA SERPL-MCNC: 2.98 NG/ML (ref 0–4)
SODIUM SERPL-SCNC: 141 MMOL/L (ref 136–145)
TRIGL SERPL-MCNC: 84 MG/DL (ref 0–150)
TSH SERPL DL<=0.005 MIU/L-ACNC: 2.08 UIU/ML (ref 0.27–4.2)
VLDLC SERPL CALC-MCNC: 17 MG/DL (ref 5–40)

## 2023-12-22 ENCOUNTER — OFFICE VISIT (OUTPATIENT)
Dept: FAMILY MEDICINE CLINIC | Facility: CLINIC | Age: 63
End: 2023-12-22
Payer: COMMERCIAL

## 2023-12-22 VITALS
DIASTOLIC BLOOD PRESSURE: 66 MMHG | OXYGEN SATURATION: 97 % | HEART RATE: 58 BPM | SYSTOLIC BLOOD PRESSURE: 120 MMHG | HEIGHT: 71 IN | BODY MASS INDEX: 33.88 KG/M2 | WEIGHT: 242 LBS

## 2023-12-22 DIAGNOSIS — E78.6 LOW HDL (UNDER 40): ICD-10-CM

## 2023-12-22 DIAGNOSIS — R73.03 PREDIABETES: ICD-10-CM

## 2023-12-22 DIAGNOSIS — E78.5 DYSLIPIDEMIA: ICD-10-CM

## 2023-12-22 DIAGNOSIS — I25.10 CORONARY ARTERIOSCLEROSIS: ICD-10-CM

## 2023-12-22 DIAGNOSIS — R73.01 ELEVATED FASTING BLOOD SUGAR: Primary | ICD-10-CM

## 2023-12-22 PROBLEM — D64.9 ANEMIA: Status: RESOLVED | Noted: 2020-07-06 | Resolved: 2023-12-22

## 2023-12-22 PROBLEM — E11.9 TYPE 2 DIABETES MELLITUS, WITHOUT LONG-TERM CURRENT USE OF INSULIN: Status: RESOLVED | Noted: 2023-06-23 | Resolved: 2023-12-22

## 2023-12-22 LAB
EXPIRATION DATE: ABNORMAL
HBA1C MFR BLD: 6.1 % (ref 4.5–5.7)
Lab: ABNORMAL

## 2023-12-22 RX ORDER — PRASUGREL 10 MG/1
10 TABLET, FILM COATED ORAL DAILY
COMMUNITY
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
## 2023-12-28 RX ORDER — LORAZEPAM 0.5 MG/1
0.5 TABLET ORAL EVERY 8 HOURS PRN
Qty: 30 TABLET | Refills: 0 | Status: SHIPPED | OUTPATIENT
Start: 2023-12-28

## 2023-12-28 NOTE — TELEPHONE ENCOUNTER
Rx Refill Note  Requested Prescriptions     Pending Prescriptions Disp Refills    LORazepam (ATIVAN) 0.5 MG tablet [Pharmacy Med Name: LORazepam 0.5 MG Oral Tablet] 30 tablet 0     Sig: TAKE 1 TABLET BY MOUTH EVERY 8 HOURS AS NEEDED FOR ANXIETY      Last office visit with prescribing clinician: 12/22/2023   Last telemedicine visit with prescribing clinician: Visit date not found   Next office visit with prescribing clinician: 6/25/2024                         Would you like a call back once the refill request has been completed: [] Yes [] No    If the office needs to give you a call back, can they leave a voicemail: [] Yes [] No    Sanna Rebollar MA  12/28/23, 13:32 EST

## 2024-02-13 RX ORDER — LORAZEPAM 0.5 MG/1
0.5 TABLET ORAL EVERY 8 HOURS PRN
Qty: 30 TABLET | Refills: 2 | Status: SHIPPED | OUTPATIENT
Start: 2024-02-13

## 2024-06-13 DIAGNOSIS — E78.5 HYPERLIPIDEMIA, UNSPECIFIED HYPERLIPIDEMIA TYPE: ICD-10-CM

## 2024-06-13 DIAGNOSIS — R73.01 ELEVATED FASTING BLOOD SUGAR: ICD-10-CM

## 2024-06-13 DIAGNOSIS — R97.20 RISING PSA LEVEL: ICD-10-CM

## 2024-06-13 DIAGNOSIS — R73.03 PREDIABETES: ICD-10-CM

## 2024-06-13 DIAGNOSIS — E78.6 LOW HDL (UNDER 40): Primary | ICD-10-CM

## 2024-06-13 DIAGNOSIS — E07.9 THYROID MASS: ICD-10-CM

## 2024-06-19 DIAGNOSIS — E78.6 LOW HDL (UNDER 40): ICD-10-CM

## 2024-06-19 DIAGNOSIS — R73.01 ELEVATED FASTING BLOOD SUGAR: ICD-10-CM

## 2024-06-19 DIAGNOSIS — E78.5 HYPERLIPIDEMIA, UNSPECIFIED HYPERLIPIDEMIA TYPE: Primary | ICD-10-CM

## 2024-06-20 LAB
ALBUMIN SERPL-MCNC: 4.2 G/DL (ref 3.5–5.2)
ALBUMIN/GLOB SERPL: 2.6 G/DL
ALP SERPL-CCNC: 61 U/L (ref 39–117)
ALT SERPL-CCNC: 35 U/L (ref 1–41)
AST SERPL-CCNC: 18 U/L (ref 1–40)
BILIRUB SERPL-MCNC: 0.4 MG/DL (ref 0–1.2)
BUN SERPL-MCNC: 16 MG/DL (ref 8–23)
BUN/CREAT SERPL: 18.6 (ref 7–25)
CALCIUM SERPL-MCNC: 8.6 MG/DL (ref 8.6–10.5)
CHLORIDE SERPL-SCNC: 105 MMOL/L (ref 98–107)
CHOLEST SERPL-MCNC: 97 MG/DL (ref 0–200)
CO2 SERPL-SCNC: 24 MMOL/L (ref 22–29)
CREAT SERPL-MCNC: 0.86 MG/DL (ref 0.76–1.27)
EGFRCR SERPLBLD CKD-EPI 2021: 97.3 ML/MIN/1.73
GLOBULIN SER CALC-MCNC: 1.6 GM/DL
GLUCOSE SERPL-MCNC: 177 MG/DL (ref 65–99)
HBA1C MFR BLD: 6.6 % (ref 4.8–5.6)
HDLC SERPL-MCNC: 41 MG/DL (ref 40–60)
LDLC SERPL CALC-MCNC: 41 MG/DL (ref 0–100)
LDLC/HDLC SERPL: 1.02 {RATIO}
POTASSIUM SERPL-SCNC: 4.2 MMOL/L (ref 3.5–5.2)
PROT SERPL-MCNC: 5.8 G/DL (ref 6–8.5)
SODIUM SERPL-SCNC: 139 MMOL/L (ref 136–145)
TRIGL SERPL-MCNC: 70 MG/DL (ref 0–150)
VLDLC SERPL CALC-MCNC: 15 MG/DL (ref 5–40)

## 2024-06-25 ENCOUNTER — TELEPHONE (OUTPATIENT)
Dept: FAMILY MEDICINE CLINIC | Facility: CLINIC | Age: 64
End: 2024-06-25

## 2024-06-25 ENCOUNTER — OFFICE VISIT (OUTPATIENT)
Dept: FAMILY MEDICINE CLINIC | Facility: CLINIC | Age: 64
End: 2024-06-25
Payer: COMMERCIAL

## 2024-06-25 VITALS
BODY MASS INDEX: 35.56 KG/M2 | HEART RATE: 51 BPM | WEIGHT: 254 LBS | RESPIRATION RATE: 16 BRPM | SYSTOLIC BLOOD PRESSURE: 108 MMHG | DIASTOLIC BLOOD PRESSURE: 60 MMHG | OXYGEN SATURATION: 96 % | HEIGHT: 71 IN

## 2024-06-25 DIAGNOSIS — E78.5 DYSLIPIDEMIA: ICD-10-CM

## 2024-06-25 DIAGNOSIS — I65.23 BILATERAL CAROTID ARTERY STENOSIS: ICD-10-CM

## 2024-06-25 DIAGNOSIS — E78.5 HYPERLIPIDEMIA, UNSPECIFIED HYPERLIPIDEMIA TYPE: ICD-10-CM

## 2024-06-25 DIAGNOSIS — I25.10 CORONARY ARTERIOSCLEROSIS: Primary | ICD-10-CM

## 2024-06-25 DIAGNOSIS — E78.6 LOW HDL (UNDER 40): ICD-10-CM

## 2024-06-25 DIAGNOSIS — E11.9 TYPE 2 DIABETES MELLITUS WITHOUT COMPLICATION, WITHOUT LONG-TERM CURRENT USE OF INSULIN: ICD-10-CM

## 2024-06-25 DIAGNOSIS — R73.03 PREDIABETES: ICD-10-CM

## 2024-06-25 PROCEDURE — 99214 OFFICE O/P EST MOD 30 MIN: CPT | Performed by: INTERNAL MEDICINE

## 2024-06-25 RX ORDER — SEMAGLUTIDE 0.68 MG/ML
0.25 INJECTION, SOLUTION SUBCUTANEOUS WEEKLY
Qty: 3 ML | Refills: 1 | Status: SHIPPED | OUTPATIENT
Start: 2024-06-25

## 2024-06-25 RX ORDER — COLCHICINE TABLETS 0.5 MG 0.5 MG/1
1 TABLET ORAL DAILY
COMMUNITY
Start: 2024-05-29
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
## 2024-06-25 NOTE — TELEPHONE ENCOUNTER
----- Message from Comfort GONZALEZ sent at 6/25/2024  3:25 PM EDT -----  Regarding: FW: [place] Cardiologist opinion  Contact: [phone]    ----- Message -----  From: Epley, Kevin M  Sent: 6/25/2024   3:21 PM EDT  To: [facility]  Subject: [place] Cardiologist opinion                   Dr. Moore said:  would recommend ozempic instead of jardiance; jardiance helps outcomes for patients with heart failure. ozempic helps patients with coronary disease live longer. unless you've been diagnosed with heart failure that i'm not aware of?

## 2024-07-16 ENCOUNTER — OFFICE (OUTPATIENT)
Dept: URBAN - METROPOLITAN AREA CLINIC 76 | Facility: CLINIC | Age: 64
End: 2024-07-16
Payer: COMMERCIAL

## 2024-07-16 VITALS
OXYGEN SATURATION: 95 % | WEIGHT: 254 LBS | HEART RATE: 59 BPM | SYSTOLIC BLOOD PRESSURE: 118 MMHG | HEIGHT: 71 IN | DIASTOLIC BLOOD PRESSURE: 80 MMHG

## 2024-07-16 DIAGNOSIS — R10.13 EPIGASTRIC PAIN: ICD-10-CM

## 2024-07-16 DIAGNOSIS — R10.84 GENERALIZED ABDOMINAL PAIN: ICD-10-CM

## 2024-07-16 DIAGNOSIS — K58.9 IRRITABLE BOWEL SYNDROME WITHOUT DIARRHEA: ICD-10-CM

## 2024-07-16 PROCEDURE — 99213 OFFICE O/P EST LOW 20 MIN: CPT | Performed by: INTERNAL MEDICINE

## 2024-07-30 RX ORDER — LORAZEPAM 0.5 MG/1
0.5 TABLET ORAL EVERY 8 HOURS PRN
Qty: 30 TABLET | Refills: 0 | Status: SHIPPED | OUTPATIENT
Start: 2024-07-30

## 2024-08-21 RX ORDER — SEMAGLUTIDE 0.68 MG/ML
INJECTION, SOLUTION SUBCUTANEOUS
Qty: 3 ML | Refills: 0 | Status: SHIPPED | OUTPATIENT
Start: 2024-08-21 | End: 2024-08-22

## 2024-08-22 RX ORDER — SEMAGLUTIDE 1.34 MG/ML
1 INJECTION, SOLUTION SUBCUTANEOUS WEEKLY
Qty: 3 ML | Refills: 0 | Status: SHIPPED | OUTPATIENT
Start: 2024-08-22

## 2024-08-22 NOTE — TELEPHONE ENCOUNTER
Rx Refill Note  Requested Prescriptions     Signed Prescriptions Disp Refills    Semaglutide, 1 MG/DOSE, (Ozempic, 1 MG/DOSE,) 4 MG/3ML solution pen-injector 3 mL 0     Sig: Inject 1 mg under the skin into the appropriate area as directed 1 (One) Time Per Week.     Authorizing Provider: CYNTHIA SOSA     Ordering User: SANNA MARES      Last office visit with prescribing clinician: 6/25/2024   Last telemedicine visit with prescribing clinician: Visit date not found   Next office visit with prescribing clinician: 10/1/2024                         Would you like a call back once the refill request has been completed: [] Yes [] No    If the office needs to give you a call back, can they leave a voicemail: [] Yes [] No    Sanna Rebollar MA  08/22/24, 11:33 EDT

## 2024-08-23 NOTE — TELEPHONE ENCOUNTER
Caller: Epley, Kevin M    Relationship: Self    Best call back number: [phone] (Mobile)       What was the call regarding: PATIENT CALLING IN STATES THE MEDICATION OZEMPIC NEEDS A PRIOR AUTH HE TALKED TO THE PHARMACY AT [facility]  LAST NIGHT THEY ADVISED CALL THE DOCTORS OFFICE TO THE DOCTOR KNOW ABOUT  PRIOR AUTH     PLEASE GIVE CALLBACK WITH ANY QUESTION ON THE PRIOR AUTH FOR THE OZEMPIC

## 2024-09-05 RX ORDER — EZETIMIBE 10 MG/1
10 TABLET ORAL DAILY
Qty: 90 TABLET | Refills: 0 | Status: SHIPPED | OUTPATIENT
Start: 2024-09-05

## 2024-09-19 RX ORDER — SEMAGLUTIDE 1.34 MG/ML
INJECTION, SOLUTION SUBCUTANEOUS
Qty: 3 ML | Refills: 0 | Status: SHIPPED | OUTPATIENT
Start: 2024-09-19

## 2024-09-24 DIAGNOSIS — E78.5 HYPERLIPIDEMIA, UNSPECIFIED HYPERLIPIDEMIA TYPE: Primary | ICD-10-CM

## 2024-09-24 DIAGNOSIS — E11.9 TYPE 2 DIABETES MELLITUS WITHOUT COMPLICATION, WITHOUT LONG-TERM CURRENT USE OF INSULIN: ICD-10-CM

## 2024-09-25 LAB
BUN SERPL-MCNC: 18 MG/DL (ref 8–23)
BUN/CREAT SERPL: 18.2 (ref 7–25)
CALCIUM SERPL-MCNC: 9 MG/DL (ref 8.6–10.5)
CHLORIDE SERPL-SCNC: 103 MMOL/L (ref 98–107)
CO2 SERPL-SCNC: 26.2 MMOL/L (ref 22–29)
CREAT SERPL-MCNC: 0.99 MG/DL (ref 0.76–1.27)
EGFRCR SERPLBLD CKD-EPI 2021: 85.6 ML/MIN/1.73
GLUCOSE SERPL-MCNC: 142 MG/DL (ref 65–99)
HBA1C MFR BLD: 6.5 % (ref 4.8–5.6)
POTASSIUM SERPL-SCNC: 4.3 MMOL/L (ref 3.5–5.2)
SODIUM SERPL-SCNC: 141 MMOL/L (ref 136–145)

## 2024-09-26 RX ORDER — SEMAGLUTIDE 1.34 MG/ML
1 INJECTION, SOLUTION SUBCUTANEOUS WEEKLY
Qty: 3 ML | Refills: 0 | Status: SHIPPED | OUTPATIENT
Start: 2024-09-26

## 2024-10-01 ENCOUNTER — OFFICE VISIT (OUTPATIENT)
Dept: FAMILY MEDICINE CLINIC | Facility: CLINIC | Age: 64
End: 2024-10-01
Payer: COMMERCIAL

## 2024-10-01 VITALS
HEIGHT: 71 IN | WEIGHT: 257 LBS | HEART RATE: 64 BPM | RESPIRATION RATE: 16 BRPM | DIASTOLIC BLOOD PRESSURE: 64 MMHG | OXYGEN SATURATION: 95 % | SYSTOLIC BLOOD PRESSURE: 110 MMHG | BODY MASS INDEX: 35.98 KG/M2

## 2024-10-01 DIAGNOSIS — E78.5 HYPERLIPIDEMIA, UNSPECIFIED HYPERLIPIDEMIA TYPE: ICD-10-CM

## 2024-10-01 DIAGNOSIS — I25.10 CORONARY ARTERIOSCLEROSIS: ICD-10-CM

## 2024-10-01 DIAGNOSIS — Z23 NEED FOR IMMUNIZATION AGAINST INFLUENZA: Primary | ICD-10-CM

## 2024-10-01 DIAGNOSIS — E11.9 TYPE 2 DIABETES MELLITUS WITHOUT COMPLICATION, WITHOUT LONG-TERM CURRENT USE OF INSULIN: ICD-10-CM

## 2024-10-01 PROCEDURE — 90471 IMMUNIZATION ADMIN: CPT | Performed by: INTERNAL MEDICINE

## 2024-10-01 PROCEDURE — 99214 OFFICE O/P EST MOD 30 MIN: CPT | Performed by: INTERNAL MEDICINE

## 2024-10-01 PROCEDURE — 90656 IIV3 VACC NO PRSV 0.5 ML IM: CPT | Performed by: INTERNAL MEDICINE

## 2024-10-01 RX ORDER — SEMAGLUTIDE 2.68 MG/ML
2 INJECTION, SOLUTION SUBCUTANEOUS WEEKLY
Qty: 3 ML | Refills: 5 | Status: SHIPPED | OUTPATIENT
Start: 2024-10-01
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
## 2024-10-09 ENCOUNTER — TELEPHONE (OUTPATIENT)
Dept: FAMILY MEDICINE CLINIC | Facility: CLINIC | Age: 64
End: 2024-10-09
Payer: COMMERCIAL

## 2024-10-09 DIAGNOSIS — E11.9 TYPE 2 DIABETES MELLITUS WITHOUT COMPLICATION, WITHOUT LONG-TERM CURRENT USE OF INSULIN: Primary | ICD-10-CM

## 2024-10-09 RX ORDER — SEMAGLUTIDE 2.68 MG/ML
2 INJECTION, SOLUTION SUBCUTANEOUS WEEKLY
Qty: 3 ML | Refills: 5 | Status: SHIPPED | OUTPATIENT
Start: 2024-10-09

## 2024-10-09 NOTE — TELEPHONE ENCOUNTER
Pharmacy Name: [facility] [place], [place] - [address] - [phone]  - [phone]        Pharmacy representative phone number: [phone]    What medication are you calling in regards to:   Semaglutide, 2 MG/DOSE, (Ozempic, 2 MG/DOSE,) 8 MG/3ML solution pen-injector     What question does the pharmacy have: PHARMACY STATES PRIOR AUTHORIZATIONS ARE HANDLED THROUGH COVER MY MEDS     Who is the provider that prescribed the medication: DR SOSA

## 2024-11-22 NOTE — DISCHARGE INSTRUCTIONS
For the next 24 hours patient needs to be with a responsible adult.    For 24 hours DO NOT drive, operate machinery, appliances, drink alcohol, make important decisions or sign legal documents.    Start with a light or bland diet if you are feeling sick to your stomach otherwise advance to regular diet as tolerated.    Follow recommendations on procedure report if provided by your doctor.    Call Dr. Jarvis for problems [phone]    Problems may include but not limited to: large amounts of bleeding, trouble breathing, repeated vomiting, severe unrelieved pain, fever or chills.         Rx Refill Note  Requested Prescriptions     Pending Prescriptions Disp Refills    diazePAM (Valium) 2 MG tablet 30 tablet 0     Sig: Take 1 tablet by mouth Every 8 (Eight) Hours As Needed for Anxiety.      Last office visit with prescribing clinician: 10/14/2024   Last telemedicine visit with prescribing clinician: 10/28/2024   Next office visit with prescribing clinician: 11/22/2024                         Would you like a call back once the refill request has been completed: [] Yes [] No    If the office needs to give you a call back, can they leave a voicemail: [] Yes [] No    Candace Solis MA  11/22/24, 10:06 EST

## 2024-12-16 RX ORDER — EZETIMIBE 10 MG/1
10 TABLET ORAL DAILY
Qty: 90 TABLET | Refills: 0 | Status: SHIPPED | OUTPATIENT
Start: 2024-12-16

## 2025-01-17 ENCOUNTER — DOCUMENTATION (OUTPATIENT)
Dept: FAMILY MEDICINE CLINIC | Facility: CLINIC | Age: 65
End: 2025-01-17
Payer: COMMERCIAL

## 2025-03-17 RX ORDER — EZETIMIBE 10 MG/1
10 TABLET ORAL DAILY
Qty: 90 TABLET | Refills: 0 | Status: SHIPPED | OUTPATIENT
Start: 2025-03-17

## 2025-04-24 ENCOUNTER — READMISSION MANAGEMENT (OUTPATIENT)
Dept: CALL CENTER | Facility: HOSPITAL | Age: 65
End: 2025-04-24
Payer: COMMERCIAL

## 2025-04-24 NOTE — OUTREACH NOTE
Prep Survey      Flowsheet Row Responses   [facility] facility patient discharged from? Non-[facility]   Is LACE score < 7 ? Non- Discharge   Eligibility [facility]   Date of Admission 04/23/25   Date of Discharge 04/24/25   Discharge Disposition Home or Self Care   Discharge diagnosis Unstable angina (Primary Dx),  Progressive angina,  CAD in native artery,  Chest pain, unspecified type,  S/P insertion of non-drug eluting coronary artery stent   Does the patient have one of the following disease processes/diagnoses(primary or secondary)? Other   Prep survey completed? Yes            Sofia APPIAH - Registered Nurse

## 2025-04-25 ENCOUNTER — TRANSITIONAL CARE MANAGEMENT TELEPHONE ENCOUNTER (OUTPATIENT)
Dept: CALL CENTER | Facility: HOSPITAL | Age: 65
End: 2025-04-25
Payer: COMMERCIAL

## 2025-04-25 NOTE — OUTREACH NOTE
Call Center TCM Note      Flowsheet Row Responses   [facility] patient discharged from? Non-  [[facility]]   Does the patient have one of the following disease processes/diagnoses(primary or secondary)? Other   TCM attempt successful? Yes   Call start time 1248   Call end time 1250   Discharge diagnosis Unstable angina (Primary Dx),  Progressive angina,  CAD in native artery,  Chest pain, unspecified type,  S/P insertion of non-drug eluting coronary artery stent   Is the patient taking all medications as directed (includes completed medication regime)? Yes   Comments Patient states he will call PCP office to schedule hospital follow up appt.   Does the patient have an appointment with their PCP within 7-14 days of discharge? No   Nursing Interventions Routed TCM call to PCP office   Psychosocial issues? No   What is the patient's perception of their health status since discharge? Improving   Is the patient/caregiver able to teach back signs and symptoms related to disease process for when to call PCP? Yes   Is the patient/caregiver able to teach back signs and symptoms related to disease process for when to call 911? Yes   Is the patient/caregiver able to teach back the hierarchy of who to call/visit for symptoms/problems? PCP, Specialist, Home health nurse, Urgent Care, ED, 911 Yes   TCM call completed? Yes   Wrap up additional comments Patient reports doing okay. No questions or concerns at this time.   Call end time 1250   Would this patient benefit from a Referral to Amb Social Work? No   Is the patient interested in additional calls from an ambulatory ? No            HUSSEIN DELA CRUZ - Registered Nurse    4/25/2025, 12:50 EDT

## 2025-06-26 ENCOUNTER — OFFICE VISIT (OUTPATIENT)
Dept: FAMILY MEDICINE CLINIC | Facility: CLINIC | Age: 65
End: 2025-06-26
Payer: COMMERCIAL

## 2025-06-26 VITALS
SYSTOLIC BLOOD PRESSURE: 110 MMHG | HEIGHT: 71 IN | OXYGEN SATURATION: 96 % | TEMPERATURE: 96.4 F | BODY MASS INDEX: 35.38 KG/M2 | HEART RATE: 70 BPM | RESPIRATION RATE: 16 BRPM | WEIGHT: 252.7 LBS | DIASTOLIC BLOOD PRESSURE: 80 MMHG

## 2025-06-26 DIAGNOSIS — E78.5 HYPERLIPIDEMIA, UNSPECIFIED HYPERLIPIDEMIA TYPE: ICD-10-CM

## 2025-06-26 DIAGNOSIS — E78.6 LOW HDL (UNDER 40): ICD-10-CM

## 2025-06-26 DIAGNOSIS — Z12.5 PROSTATE CANCER SCREENING: ICD-10-CM

## 2025-06-26 DIAGNOSIS — I25.10 CORONARY ARTERIOSCLEROSIS: ICD-10-CM

## 2025-06-26 DIAGNOSIS — E11.9 TYPE 2 DIABETES MELLITUS WITHOUT COMPLICATION, WITHOUT LONG-TERM CURRENT USE OF INSULIN: ICD-10-CM

## 2025-06-26 DIAGNOSIS — I65.23 BILATERAL CAROTID ARTERY STENOSIS: Primary | ICD-10-CM

## 2025-06-26 DIAGNOSIS — Z00.00 ANNUAL PHYSICAL EXAM: ICD-10-CM
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
## 2025-06-27 RX ORDER — EZETIMIBE 10 MG/1
10 TABLET ORAL DAILY
Qty: 90 TABLET | Refills: 1 | Status: SHIPPED | OUTPATIENT
Start: 2025-06-27

## 2025-06-28 LAB
ALBUMIN SERPL-MCNC: 4.4 G/DL (ref 3.9–4.9)
ALBUMIN/CREAT UR: <15 MG/G CREAT (ref 0–29)
ALP SERPL-CCNC: 82 IU/L (ref 44–121)
ALT SERPL-CCNC: 30 IU/L (ref 0–44)
APO B SERPL-MCNC: 45 MG/DL
AST SERPL-CCNC: 22 IU/L (ref 0–40)
BASOPHILS # BLD AUTO: 0 X10E3/UL (ref 0–0.2)
BASOPHILS NFR BLD AUTO: 1 %
BILIRUB SERPL-MCNC: 0.5 MG/DL (ref 0–1.2)
BUN SERPL-MCNC: 15 MG/DL (ref 8–27)
BUN/CREAT SERPL: 16 (ref 10–24)
CALCIUM SERPL-MCNC: 8.7 MG/DL (ref 8.6–10.2)
CHLORIDE SERPL-SCNC: 106 MMOL/L (ref 96–106)
CHOLEST SERPL-MCNC: 80 MG/DL (ref 100–199)
CO2 SERPL-SCNC: 22 MMOL/L (ref 20–29)
CREAT SERPL-MCNC: 0.94 MG/DL (ref 0.76–1.27)
CREAT UR-MCNC: 19.4 MG/DL
EGFRCR SERPLBLD CKD-EPI 2021: 91 ML/MIN/1.73
EOSINOPHIL # BLD AUTO: 0.1 X10E3/UL (ref 0–0.4)
EOSINOPHIL NFR BLD AUTO: 2 %
ERYTHROCYTE [DISTWIDTH] IN BLOOD BY AUTOMATED COUNT: 13.1 % (ref 11.6–15.4)
GLOBULIN SER CALC-MCNC: 2 G/DL (ref 1.5–4.5)
GLUCOSE SERPL-MCNC: 151 MG/DL (ref 70–99)
HBA1C MFR BLD: 6.5 % (ref 4.8–5.6)
HCT VFR BLD AUTO: 47.9 % (ref 37.5–51)
HDLC SERPL-MCNC: 31 MG/DL
HGB BLD-MCNC: 15.3 G/DL (ref 13–17.7)
IMM GRANULOCYTES # BLD AUTO: 0 X10E3/UL (ref 0–0.1)
IMM GRANULOCYTES NFR BLD AUTO: 0 %
LDLC SERPL CALC-MCNC: 21 MG/DL (ref 0–99)
LDLC/HDLC SERPL: 0.7 RATIO (ref 0–3.6)
LPA SERPL-SCNC: 42.8 NMOL/L
LYMPHOCYTES # BLD AUTO: 1.7 X10E3/UL (ref 0.7–3.1)
LYMPHOCYTES NFR BLD AUTO: 19 %
MCH RBC QN AUTO: 28.5 PG (ref 26.6–33)
MCHC RBC AUTO-ENTMCNC: 31.9 G/DL (ref 31.5–35.7)
MCV RBC AUTO: 89 FL (ref 79–97)
MICROALBUMIN UR-MCNC: <3 UG/ML
MONOCYTES # BLD AUTO: 0.8 X10E3/UL (ref 0.1–0.9)
MONOCYTES NFR BLD AUTO: 9 %
NEUTROPHILS # BLD AUTO: 6.1 X10E3/UL (ref 1.4–7)
NEUTROPHILS NFR BLD AUTO: 69 %
PLATELET # BLD AUTO: 186 X10E3/UL (ref 150–450)
POTASSIUM SERPL-SCNC: 4.5 MMOL/L (ref 3.5–5.2)
PROT SERPL-MCNC: 6.4 G/DL (ref 6–8.5)
PSA SERPL-MCNC: 4.1 NG/ML (ref 0–4)
RBC # BLD AUTO: 5.37 X10E6/UL (ref 4.14–5.8)
SODIUM SERPL-SCNC: 141 MMOL/L (ref 134–144)
TRIGL SERPL-MCNC: 169 MG/DL (ref 0–149)
TSH SERPL DL<=0.005 MIU/L-ACNC: 1.8 UIU/ML (ref 0.45–4.5)
VLDLC SERPL CALC-MCNC: 28 MG/DL (ref 5–40)
WBC # BLD AUTO: 8.7 X10E3/UL (ref 3.4–10.8)

## (undated) DEVICE — BITEBLOCK OMNI BLOC

## (undated) DEVICE — ADAPT CLN BIOGUARD AIR/H2O DISP

## (undated) DEVICE — THE TORRENT IRRIGATION SCOPE CONNECTOR IS USED WITH THE TORRENT IRRIGATION TUBING TO PROVIDE IRRIGATION FLUIDS SUCH AS STERILE WATER DURING GASTROINTESTINAL ENDOSCOPIC PROCEDURES WHEN USED IN CONJUNCTION WITH AN IRRIGATION PUMP (OR ELECTROSURGICAL UNIT).: Brand: TORRENT

## (undated) DEVICE — SENSR O2 OXIMAX FNGR A/ 18IN NONSTR

## (undated) DEVICE — CANN O2 ETCO2 FITS ALL CONN CO2 SMPL A/ 7IN DISP LF

## (undated) DEVICE — LN SMPL CO2 SHTRM SD STREAM W/M LUER

## (undated) DEVICE — TUBING, SUCTION, 1/4" X 10', STRAIGHT: Brand: MEDLINE

## (undated) DEVICE — FRCP BX RADJAW4 NDL 2.8 240CM LG OG BX40

## (undated) DEVICE — KT ORCA ORCAPOD DISP STRL